# Patient Record
Sex: FEMALE | Race: WHITE | Employment: OTHER | URBAN - NONMETROPOLITAN AREA
[De-identification: names, ages, dates, MRNs, and addresses within clinical notes are randomized per-mention and may not be internally consistent; named-entity substitution may affect disease eponyms.]

---

## 2017-03-29 RX ORDER — LEVOTHYROXINE SODIUM 0.05 MG/1
50 TABLET ORAL DAILY
Qty: 90 TABLET | Refills: 1 | Status: SHIPPED | OUTPATIENT
Start: 2017-03-29 | End: 2017-10-11 | Stop reason: SDUPTHER

## 2017-04-18 ENCOUNTER — OFFICE VISIT (OUTPATIENT)
Dept: FAMILY MEDICINE CLINIC | Age: 82
End: 2017-04-18

## 2017-04-18 VITALS
DIASTOLIC BLOOD PRESSURE: 60 MMHG | HEART RATE: 55 BPM | BODY MASS INDEX: 18.67 KG/M2 | WEIGHT: 95.6 LBS | SYSTOLIC BLOOD PRESSURE: 123 MMHG

## 2017-04-18 DIAGNOSIS — E78.00 PURE HYPERCHOLESTEROLEMIA: Primary | ICD-10-CM

## 2017-04-18 DIAGNOSIS — M48.061 LUMBAR SPINAL STENOSIS: ICD-10-CM

## 2017-04-18 DIAGNOSIS — L30.9 DERMATITIS: ICD-10-CM

## 2017-04-18 PROCEDURE — 1123F ACP DISCUSS/DSCN MKR DOCD: CPT | Performed by: FAMILY MEDICINE

## 2017-04-18 PROCEDURE — 1036F TOBACCO NON-USER: CPT | Performed by: FAMILY MEDICINE

## 2017-04-18 PROCEDURE — G8427 DOCREV CUR MEDS BY ELIG CLIN: HCPCS | Performed by: FAMILY MEDICINE

## 2017-04-18 PROCEDURE — 99213 OFFICE O/P EST LOW 20 MIN: CPT | Performed by: FAMILY MEDICINE

## 2017-04-18 PROCEDURE — 1090F PRES/ABSN URINE INCON ASSESS: CPT | Performed by: FAMILY MEDICINE

## 2017-04-18 PROCEDURE — G8419 CALC BMI OUT NRM PARAM NOF/U: HCPCS | Performed by: FAMILY MEDICINE

## 2017-04-18 PROCEDURE — 4040F PNEUMOC VAC/ADMIN/RCVD: CPT | Performed by: FAMILY MEDICINE

## 2017-10-11 RX ORDER — LEVOTHYROXINE SODIUM 0.05 MG/1
50 TABLET ORAL DAILY
Qty: 30 TABLET | Refills: 0 | Status: SHIPPED | OUTPATIENT
Start: 2017-10-11 | End: 2017-10-13 | Stop reason: SDUPTHER

## 2017-10-11 NOTE — TELEPHONE ENCOUNTER
[place] [address] called requesting a refill on the following medications:  Requested Prescriptions     Pending Prescriptions Disp Refills    levothyroxine (SYNTHROID) 50 MCG tablet 90 tablet 1     Sig: Take 1 tablet by mouth daily     Pharmacy verified:  [email or web address]      Date of last visit: 4/18/17  Date of next visit (if applicable): 86/29/2208    Pt is having her son pick it up this evening if possible    Date of last fill and quantity (to be completed by clinical staff)  Pharmacy name: [email or web address]

## 2017-10-13 RX ORDER — LEVOTHYROXINE SODIUM 0.05 MG/1
50 TABLET ORAL DAILY
Qty: 30 TABLET | Refills: 0 | Status: SHIPPED | OUTPATIENT
Start: 2017-10-13 | End: 2017-11-27 | Stop reason: SDUPTHER

## 2017-11-27 RX ORDER — LEVOTHYROXINE SODIUM 0.05 MG/1
50 TABLET ORAL DAILY
Qty: 30 TABLET | Refills: 0 | Status: SHIPPED | OUTPATIENT
Start: 2017-11-27 | End: 2017-12-07 | Stop reason: SDUPTHER

## 2017-11-29 ENCOUNTER — OFFICE VISIT (OUTPATIENT)
Dept: FAMILY MEDICINE CLINIC | Age: 82
End: 2017-11-29
Payer: MEDICARE

## 2017-11-29 VITALS
TEMPERATURE: 97.6 F | HEART RATE: 82 BPM | DIASTOLIC BLOOD PRESSURE: 60 MMHG | SYSTOLIC BLOOD PRESSURE: 102 MMHG | WEIGHT: 90 LBS | HEIGHT: 60 IN | BODY MASS INDEX: 17.67 KG/M2

## 2017-11-29 DIAGNOSIS — R53.83 TIREDNESS: ICD-10-CM

## 2017-11-29 DIAGNOSIS — H61.23 BILATERAL IMPACTED CERUMEN: ICD-10-CM

## 2017-11-29 DIAGNOSIS — E03.9 ACQUIRED HYPOTHYROIDISM: ICD-10-CM

## 2017-11-29 DIAGNOSIS — J20.8 ACUTE BRONCHITIS DUE TO OTHER SPECIFIED ORGANISMS: Primary | ICD-10-CM

## 2017-11-29 PROCEDURE — G8419 CALC BMI OUT NRM PARAM NOF/U: HCPCS | Performed by: FAMILY MEDICINE

## 2017-11-29 PROCEDURE — 1123F ACP DISCUSS/DSCN MKR DOCD: CPT | Performed by: FAMILY MEDICINE

## 2017-11-29 PROCEDURE — G8427 DOCREV CUR MEDS BY ELIG CLIN: HCPCS | Performed by: FAMILY MEDICINE

## 2017-11-29 PROCEDURE — 4040F PNEUMOC VAC/ADMIN/RCVD: CPT | Performed by: FAMILY MEDICINE

## 2017-11-29 PROCEDURE — 1090F PRES/ABSN URINE INCON ASSESS: CPT | Performed by: FAMILY MEDICINE

## 2017-11-29 PROCEDURE — G8484 FLU IMMUNIZE NO ADMIN: HCPCS | Performed by: FAMILY MEDICINE

## 2017-11-29 PROCEDURE — 1036F TOBACCO NON-USER: CPT | Performed by: FAMILY MEDICINE

## 2017-11-29 PROCEDURE — 99213 OFFICE O/P EST LOW 20 MIN: CPT | Performed by: FAMILY MEDICINE

## 2017-11-29 RX ORDER — DOXYCYCLINE HYCLATE 100 MG
100 TABLET ORAL 2 TIMES DAILY
Qty: 20 TABLET | Refills: 0 | Status: SHIPPED | OUTPATIENT
Start: 2017-11-29 | End: 2018-02-13

## 2017-11-29 ASSESSMENT — ENCOUNTER SYMPTOMS
SHORTNESS OF BREATH: 0
WHEEZING: 0
VOMITING: 0
NAUSEA: 0
SORE THROAT: 0
SINUS PRESSURE: 1
COUGH: 1
RHINORRHEA: 1

## 2017-11-29 NOTE — PROGRESS NOTES
TABS Take 650 mg by mouth every 4 hours as needed for Pain (For mild pain level 1-3 or for fever > 100.5). 30 tablet 4    aspirin EC 81 MG EC tablet Take 1 tablet by mouth daily. 30 tablet 3    Multiple Vitamins-Minerals (VISION-PABLO PRESERVE PO) Take 1 tablet by mouth daily.  multivitamin (THERAGRAN) per tablet Take 1 tablet by mouth daily.  Cholecalciferol (VITAMIN D3) 3000 UNITS TABS Take  by mouth. No current facility-administered medications for this visit. Allergies   Allergen Reactions    Nsaids Other (See Comments)     Bleeding gastric ulcer    Codeine Other (See Comments)     Pass out    Dye [Iodides] Hives    Lidocaine-Epinephrine      Fast and irregular heart rate    Morphine Other (See Comments)     Makes her \"out of her head\"    Procaine Hcl      Pass out    Ultram [Tramadol] Anxiety     Health Maintenance   Topic Date Due    DTaP/Tdap/Td vaccine (1 - Tdap) 1934    Zostavax vaccine  10/10/1975    Pneumococcal low/med risk (1 of 2 - PCV13) 10/10/1980    Flu vaccine (1) 09/01/2017       Objective:     /60 (Site: Left Arm, Position: Sitting, Cuff Size: Medium Adult)   Pulse 82   Temp 97.6 °F (36.4 °C) (Oral)   Ht 5' (1.524 m)   Wt 90 lb (40.8 kg)   BMI 17.58 kg/m²   Physical Exam   Constitutional: She is oriented to person, place, and time. She appears well-developed and well-nourished. HENT:   Nose: Mucosal edema and rhinorrhea present. Mouth/Throat: Oropharynx is clear and moist.   Bilateral cerumen impaction present   Cardiovascular: Normal rate and regular rhythm. Pulmonary/Chest: Effort normal and breath sounds normal. No respiratory distress. She has no wheezes. Neurological: She is alert and oriented to person, place, and time. Psychiatric: She has a normal mood and affect. Her behavior is normal.   Vitals reviewed. Harsh cough is present    Impression/Plan:  1. Acute bronchitis due to other specified organisms  New problem.   Unlikely

## 2017-12-04 ENCOUNTER — NURSE ONLY (OUTPATIENT)
Dept: FAMILY MEDICINE CLINIC | Age: 82
End: 2017-12-04

## 2017-12-04 DIAGNOSIS — H61.23 EXCESSIVE EAR WAX, BILATERAL: Primary | ICD-10-CM

## 2017-12-04 DIAGNOSIS — E03.9 ACQUIRED HYPOTHYROIDISM: ICD-10-CM

## 2017-12-04 DIAGNOSIS — R53.83 TIREDNESS: ICD-10-CM

## 2017-12-04 LAB
ALBUMIN SERPL-MCNC: 3.6 G/DL (ref 3.5–5.1)
ALP BLD-CCNC: 73 U/L (ref 38–126)
ALT SERPL-CCNC: 8 U/L (ref 11–66)
ANION GAP SERPL CALCULATED.3IONS-SCNC: 14 MEQ/L (ref 8–16)
ANISOCYTOSIS: ABNORMAL
AST SERPL-CCNC: 19 U/L (ref 5–40)
BASOPHILS # BLD: 1.1 %
BASOPHILS ABSOLUTE: 0.1 THOU/MM3 (ref 0–0.1)
BILIRUB SERPL-MCNC: 0.4 MG/DL (ref 0.3–1.2)
BUN BLDV-MCNC: 18 MG/DL (ref 7–22)
CALCIUM SERPL-MCNC: 9.4 MG/DL (ref 8.5–10.5)
CHLORIDE BLD-SCNC: 106 MEQ/L (ref 98–111)
CO2: 24 MEQ/L (ref 23–33)
CREAT SERPL-MCNC: 1.1 MG/DL (ref 0.4–1.2)
EOSINOPHIL # BLD: 1.8 %
EOSINOPHILS ABSOLUTE: 0.1 THOU/MM3 (ref 0–0.4)
GFR SERPL CREATININE-BSD FRML MDRD: 45 ML/MIN/1.73M2
GLUCOSE BLD-MCNC: 137 MG/DL (ref 70–108)
HCT VFR BLD CALC: 36.9 % (ref 37–47)
HEMOGLOBIN: 12.1 GM/DL (ref 12–16)
LYMPHOCYTES # BLD: 32.1 %
LYMPHOCYTES ABSOLUTE: 1.7 THOU/MM3 (ref 1–4.8)
MCH RBC QN AUTO: 30.3 PG (ref 27–31)
MCHC RBC AUTO-ENTMCNC: 32.8 GM/DL (ref 33–37)
MCV RBC AUTO: 92.4 FL (ref 81–99)
MONOCYTES # BLD: 4.3 %
MONOCYTES ABSOLUTE: 0.2 THOU/MM3 (ref 0.4–1.3)
NUCLEATED RED BLOOD CELLS: 0 /100 WBC
PDW BLD-RTO: 15.2 % (ref 11.5–14.5)
PLATELET # BLD: 321 THOU/MM3 (ref 130–400)
PMV BLD AUTO: 8.8 MCM (ref 7.4–10.4)
POTASSIUM SERPL-SCNC: 4.3 MEQ/L (ref 3.5–5.2)
RBC # BLD: 3.99 MILL/MM3 (ref 4.2–5.4)
SEG NEUTROPHILS: 60.7 %
SEGMENTED NEUTROPHILS ABSOLUTE COUNT: 3.2 THOU/MM3 (ref 1.8–7.7)
SODIUM BLD-SCNC: 144 MEQ/L (ref 135–145)
TOTAL PROTEIN: 6.5 G/DL (ref 6.1–8)
TSH SERPL DL<=0.05 MIU/L-ACNC: 4.05 UIU/ML (ref 0.4–4.2)
WBC # BLD: 5.3 THOU/MM3 (ref 4.8–10.8)

## 2017-12-04 NOTE — PROGRESS NOTES
Bilateral ears were lavaged and a moderate amount of was was removed from the right ear. Only a small amount was removed from the left ear. These were checked by Dr Trevor Blanchard. The [facility] staff is to use the Debrox ear drops for 4 more days in the left ear and she will return on Friday for further flushing. Her son was with her and states he understands the plan.

## 2017-12-07 ENCOUNTER — OFFICE VISIT (OUTPATIENT)
Dept: FAMILY MEDICINE CLINIC | Age: 82
End: 2017-12-07
Payer: MEDICARE

## 2017-12-07 VITALS
HEART RATE: 66 BPM | WEIGHT: 90 LBS | BODY MASS INDEX: 17.67 KG/M2 | DIASTOLIC BLOOD PRESSURE: 80 MMHG | HEIGHT: 60 IN | SYSTOLIC BLOOD PRESSURE: 122 MMHG

## 2017-12-07 DIAGNOSIS — I87.2 VENOUS INSUFFICIENCY OF BOTH LOWER EXTREMITIES: Chronic | ICD-10-CM

## 2017-12-07 DIAGNOSIS — E03.9 ACQUIRED HYPOTHYROIDISM: Primary | ICD-10-CM

## 2017-12-07 DIAGNOSIS — N18.30 CKD (CHRONIC KIDNEY DISEASE), STAGE III (HCC): ICD-10-CM

## 2017-12-07 DIAGNOSIS — R73.01 ELEVATED FASTING GLUCOSE: ICD-10-CM

## 2017-12-07 PROCEDURE — 1090F PRES/ABSN URINE INCON ASSESS: CPT | Performed by: FAMILY MEDICINE

## 2017-12-07 PROCEDURE — 1036F TOBACCO NON-USER: CPT | Performed by: FAMILY MEDICINE

## 2017-12-07 PROCEDURE — 4040F PNEUMOC VAC/ADMIN/RCVD: CPT | Performed by: FAMILY MEDICINE

## 2017-12-07 PROCEDURE — G8484 FLU IMMUNIZE NO ADMIN: HCPCS | Performed by: FAMILY MEDICINE

## 2017-12-07 PROCEDURE — G8427 DOCREV CUR MEDS BY ELIG CLIN: HCPCS | Performed by: FAMILY MEDICINE

## 2017-12-07 PROCEDURE — 99213 OFFICE O/P EST LOW 20 MIN: CPT | Performed by: FAMILY MEDICINE

## 2017-12-07 PROCEDURE — 1123F ACP DISCUSS/DSCN MKR DOCD: CPT | Performed by: FAMILY MEDICINE

## 2017-12-07 PROCEDURE — G8419 CALC BMI OUT NRM PARAM NOF/U: HCPCS | Performed by: FAMILY MEDICINE

## 2017-12-07 RX ORDER — LEVOTHYROXINE SODIUM 0.05 MG/1
50 TABLET ORAL DAILY
Qty: 90 TABLET | Refills: 3 | Status: SHIPPED | OUTPATIENT
Start: 2017-12-07 | End: 2018-06-12 | Stop reason: SDUPTHER

## 2017-12-07 NOTE — PROGRESS NOTES
[facility] PROFESSIONAL SERVS  [facility]  [address] [address]  Dept: [phone]  Dept Fax: [phone]  Loc: [phone]  PROGRESS NOTE      Visit Date: 12/7/2017    Kalpesh Robert is a 80 y.o. female who presents today for:  Chief Complaint   Patient presents with    Follow-up     bronchitis- feeling better, decreased coughing,  edema in bilateral feet        Subjective:  HPI    F/u bronchitis. Doing better. Decreased coughing. Took some of her antibiotic.      6 month f/u hypothyroidism. On synthroid 50 mcg. No symptoms. Needs ears cleaned tomorrow. Edema in feet. She wears compressive stockings. Son is present    Review of Systems     Past Medical History:   Diagnosis Date    Osteoarthritis     Stenosis of cervical spine     Thyroid disease     Ulcer ([address]       Past Surgical History:   Procedure Laterality Date    BACK SURGERY      COLONOSCOPY      HYSTERECTOMY      SHOULDER SURGERY      UPPER GASTROINTESTINAL ENDOSCOPY       Family History   Problem Relation Age of Onset    Heart Disease Father     Early Death Father     Diabetes Maternal Aunt     Heart Disease Maternal Aunt     Heart Disease Maternal Uncle     Diabetes Paternal Aunt     Diabetes Paternal Uncle     Stroke Maternal Grandmother     Dementia Maternal Grandfather     Diabetes Paternal Grandmother     Diabetes Paternal Grandfather      Social History   Substance Use Topics    Smoking status: Former Smoker     Quit date: 1/1/1976    Smokeless tobacco: Never Used    Alcohol use No      Current Outpatient Prescriptions   Medication Sig Dispense Refill    doxycycline hyclate (VIBRA-TABS) 100 MG tablet Take 1 tablet by mouth 2 times daily 20 tablet 0    levothyroxine (SYNTHROID) 50 MCG tablet Take 1 tablet by mouth daily 30 tablet 0    Cyanocobalamin (B-12) 3000 MCG CAPS Take  by mouth daily.       acetaminophen 650 MG TABS Take 650 mg by mouth every 12/04/2017    BUN 18 12/04/2017    CO2 24 12/04/2017    TSH 4.050 12/04/2017       Impression/Plan:  1. Acquired hypothyroidism  controlled  -refill levothyroxine (SYNTHROID) 50 MCG tablet; Take 1 tablet by mouth daily  Dispense: 90 tablet; Refill: 3    2. CKD (chronic kidney disease), stage III  Stable. 3. Venous insufficiency of both lower extremities  Stable. Elevate legs. No evidence of DVT. 4. Elevated fasting glucose  Glucose 137. We decide to not pursue testing for DM      Had flu vaccine  Declines pneumo vaccine. Had 1 about 20 years ago. They voiced understanding. All questions answered. They agreed with treatment plan. Discussed use, benefit, and side effects of prescribed medications. Reviewed health maintenance. Return in about 6 months (around 6/7/2018) for thyroid.        Electronically signed by Jolly Sandoval MD on 12/7/2017 at 3:44 PM

## 2017-12-08 ENCOUNTER — TELEPHONE (OUTPATIENT)
Dept: FAMILY MEDICINE CLINIC | Age: 82
End: 2017-12-08

## 2017-12-08 NOTE — TELEPHONE ENCOUNTER
[employer] came in for a repeat lavage of her left ear. I was unable to remove the dried skin and wax mass that was in her ear canal, but it was becoming tender with lavaging. Dr. Keenan Nails check this ear canal and removed a small amount of the skin. ATB drops were instilled x 1 dose.

## 2018-02-13 ENCOUNTER — HOSPITAL ENCOUNTER (EMERGENCY)
Age: 83
Discharge: HOME | End: 2018-02-13
Attending: FAMILY MEDICINE
Payer: MEDICARE

## 2018-02-13 VITALS
OXYGEN SATURATION: 98 % | HEART RATE: 89 BPM | BODY MASS INDEX: 17.58 KG/M2 | RESPIRATION RATE: 16 BRPM | DIASTOLIC BLOOD PRESSURE: 72 MMHG | SYSTOLIC BLOOD PRESSURE: 155 MMHG | WEIGHT: 90 LBS | TEMPERATURE: 97.7 F

## 2018-02-13 DIAGNOSIS — R42 LIGHTHEADEDNESS: ICD-10-CM

## 2018-02-13 DIAGNOSIS — R19.7 DIARRHEA, UNSPECIFIED TYPE: Primary | ICD-10-CM

## 2018-02-13 LAB
ANION GAP SERPL CALCULATED.3IONS-SCNC: 10 MEQ/L (ref 8–16)
ANISOCYTOSIS: ABNORMAL
BASOPHILS # BLD: 1.1 %
BASOPHILS ABSOLUTE: 0.1 THOU/MM3 (ref 0–0.1)
BUN BLDV-MCNC: 18 MG/DL (ref 7–22)
CALCIUM SERPL-MCNC: 10.2 MG/DL (ref 8.5–10.5)
CHLORIDE BLD-SCNC: 103 MEQ/L (ref 98–111)
CO2: 28 MEQ/L (ref 23–33)
CREAT SERPL-MCNC: 0.9 MG/DL (ref 0.4–1.2)
EKG ATRIAL RATE: 84 BPM
EKG P AXIS: 76 DEGREES
EKG P-R INTERVAL: 184 MS
EKG Q-T INTERVAL: 384 MS
EKG QRS DURATION: 112 MS
EKG QTC CALCULATION (BAZETT): 453 MS
EKG R AXIS: -48 DEGREES
EKG T AXIS: 102 DEGREES
EKG VENTRICULAR RATE: 84 BPM
EOSINOPHIL # BLD: 1.4 %
EOSINOPHILS ABSOLUTE: 0.1 THOU/MM3 (ref 0–0.4)
GFR SERPL CREATININE-BSD FRML MDRD: 57 ML/MIN/1.73M2
GLUCOSE BLD-MCNC: 128 MG/DL (ref 70–108)
HCT VFR BLD CALC: 36.5 % (ref 37–47)
HEMOGLOBIN: 11.8 GM/DL (ref 12–16)
LYMPHOCYTES # BLD: 27 %
LYMPHOCYTES ABSOLUTE: 1.3 THOU/MM3 (ref 1–4.8)
MCH RBC QN AUTO: 29.8 PG (ref 27–31)
MCHC RBC AUTO-ENTMCNC: 32.4 GM/DL (ref 33–37)
MCV RBC AUTO: 92 FL (ref 81–99)
MONOCYTES # BLD: 6.3 %
MONOCYTES ABSOLUTE: 0.3 THOU/MM3 (ref 0.4–1.3)
NUCLEATED RED BLOOD CELLS: 0 /100 WBC
OSMOLALITY CALCULATION: 284.8 MOSMOL/KG (ref 275–300)
PDW BLD-RTO: 16.7 % (ref 11.5–14.5)
PLATELET # BLD: 238 THOU/MM3 (ref 130–400)
PMV BLD AUTO: 8.3 FL (ref 7.4–10.4)
POTASSIUM SERPL-SCNC: 5 MEQ/L (ref 3.5–5.2)
RBC # BLD: 3.96 MILL/MM3 (ref 4.2–5.4)
SEG NEUTROPHILS: 64.2 %
SEGMENTED NEUTROPHILS ABSOLUTE COUNT: 3.1 THOU/MM3 (ref 1.8–7.7)
SODIUM BLD-SCNC: 141 MEQ/L (ref 135–145)
WBC # BLD: 4.8 THOU/MM3 (ref 4.8–10.8)

## 2018-02-13 PROCEDURE — 96360 HYDRATION IV INFUSION INIT: CPT

## 2018-02-13 PROCEDURE — 96361 HYDRATE IV INFUSION ADD-ON: CPT

## 2018-02-13 PROCEDURE — 80048 BASIC METABOLIC PNL TOTAL CA: CPT

## 2018-02-13 PROCEDURE — 85025 COMPLETE CBC W/AUTO DIFF WBC: CPT

## 2018-02-13 PROCEDURE — 36415 COLL VENOUS BLD VENIPUNCTURE: CPT

## 2018-02-13 PROCEDURE — 93005 ELECTROCARDIOGRAM TRACING: CPT | Performed by: FAMILY MEDICINE

## 2018-02-13 PROCEDURE — 2580000003 HC RX 258: Performed by: FAMILY MEDICINE

## 2018-02-13 PROCEDURE — 99284 EMERGENCY DEPT VISIT MOD MDM: CPT

## 2018-02-13 RX ORDER — 0.9 % SODIUM CHLORIDE 0.9 %
500 INTRAVENOUS SOLUTION INTRAVENOUS ONCE
Status: COMPLETED | OUTPATIENT
Start: 2018-02-13 | End: 2018-02-13

## 2018-02-13 RX ORDER — SODIUM CHLORIDE 9 MG/ML
INJECTION, SOLUTION INTRAVENOUS CONTINUOUS
Status: DISCONTINUED | OUTPATIENT
Start: 2018-02-13 | End: 2018-02-14 | Stop reason: HOSPADM

## 2018-02-13 RX ADMIN — SODIUM CHLORIDE 500 ML: 9 INJECTION, SOLUTION INTRAVENOUS at 20:00

## 2018-02-13 RX ADMIN — SODIUM CHLORIDE: 9 INJECTION, SOLUTION INTRAVENOUS at 20:15

## 2018-02-13 ASSESSMENT — ENCOUNTER SYMPTOMS
NAUSEA: 0
DIARRHEA: 1
TROUBLE SWALLOWING: 0
VOMITING: 0
ABDOMINAL PAIN: 0
SHORTNESS OF BREATH: 0

## 2018-02-13 ASSESSMENT — PAIN DESCRIPTION - ORIENTATION: ORIENTATION: LOWER

## 2018-02-13 ASSESSMENT — PAIN DESCRIPTION - LOCATION: LOCATION: ABDOMEN

## 2018-02-13 ASSESSMENT — PAIN SCALES - GENERAL: PAINLEVEL_OUTOF10: 4

## 2018-02-13 ASSESSMENT — PAIN DESCRIPTION - ONSET: ONSET: GRADUAL

## 2018-02-13 ASSESSMENT — PAIN DESCRIPTION - PAIN TYPE: TYPE: ACUTE PAIN

## 2018-02-13 ASSESSMENT — PAIN DESCRIPTION - FREQUENCY: FREQUENCY: CONTINUOUS

## 2018-02-14 PROCEDURE — 93010 ELECTROCARDIOGRAM REPORT: CPT | Performed by: INTERNAL MEDICINE

## 2018-02-14 NOTE — ED PROVIDER NOTES
Negative for rash. Neurological: Positive for light-headedness. Negative for weakness and numbness. Hematological: Does not bruise/bleed easily. Psychiatric/Behavioral: Negative for confusion. PAST MEDICAL HISTORY    has a past medical history of Osteoarthritis; Stenosis of cervical spine; Thyroid disease; and Ulcer ([address] SURGICAL HISTORY      has a past surgical history that includes Hysterectomy; back surgery; shoulder surgery; Colonoscopy; and Upper gastrointestinal endoscopy. CURRENT MEDICATIONS       Previous Medications    ACETAMINOPHEN 650 MG TABS    Take 650 mg by mouth every 4 hours as needed for Pain (For mild pain level 1-3 or for fever > 100.5). CHOLECALCIFEROL (VITAMIN D3) 3000 UNITS TABS    Take  by mouth. LEVOTHYROXINE (SYNTHROID) 50 MCG TABLET    Take 1 tablet by mouth daily    MULTIPLE VITAMINS-MINERALS (VISION-PABLO PRESERVE PO)    Take 1 tablet by mouth daily. MULTIVITAMIN (THERAGRAN) PER TABLET    Take 1 tablet by mouth daily. ALLERGIES     is allergic to nsaids; codeine; dye [iodides]; lidocaine-epinephrine; morphine; procaine hcl; and ultram [tramadol]. FAMILY HISTORY     indicated that her mother is . She indicated that her father is . She indicated that her maternal grandmother is . She indicated that her maternal grandfather is . She indicated that her paternal grandmother is . She indicated that her paternal grandfather is . She indicated that the status of her maternal aunt is unknown. She indicated that the status of her maternal uncle is unknown. She indicated that the status of her paternal aunt is unknown.  She indicated that the status of her paternal uncle is unknown.    family history includes Dementia in her maternal grandfather; Diabetes in her maternal aunt, paternal aunt, paternal grandfather, paternal grandmother, and paternal uncle; Early Death in her father; Heart Disease in her father, maternal aunt, and maternal uncle; Stroke in her maternal grandmother. SOCIAL HISTORY      reports that she quit smoking about 42 years ago. She has never used smokeless tobacco. She reports that she does not drink alcohol or use drugs. PHYSICAL EXAM     INITIAL VITALS:  weight is 90 lb (40.8 kg). Her oral temperature is 97.7 °F (36.5 °C). Her blood pressure is 155/72 (abnormal) and her pulse is 89. Her respiration is 16 and oxygen saturation is 98%. Physical Exam   Constitutional: She is oriented to person, place, and time. GCS 15    Alert, very active and sharp for 8 years old   HENT:   Head: Normocephalic and atraumatic. Eyes: Conjunctivae are normal. Pupils are equal, round, and reactive to light. No scleral icterus. Neck: Normal range of motion. Neck supple. No JVD present. Cardiovascular: Normal rate and regular rhythm. Pulmonary/Chest: Effort normal and breath sounds normal.   Abdominal: Soft. Bowel sounds are normal. There is no tenderness. There is no rebound and no guarding. Musculoskeletal: Normal range of motion. She exhibits no edema. Neurological: She is alert and oriented to person, place, and time. She exhibits normal muscle tone. Skin: Skin is warm and dry. Psychiatric: She has a normal mood and affect. Her behavior is normal.   Nursing note and vitals reviewed. DIFFERENTIAL DIAGNOSIS:     1 episode of diarrhea with lightheadedness which is better    Give IV fluids, check blood work    DIAGNOSTIC RESULTS     EKG: All EKG's are interpreted by the Emergency Department Physician who either signs or Co-signs this chart in the absence of a cardiologist.  EKG interpreted by Yasmine Reyes MD:    EKG showed sinus rhythm with rate 84. QRS complexes show left axis, 112 ms conduction.   Slow R wave progress across precordium possible old anterolateral MI  ST-T waves show ST sagging 1 aVL and V6  No prior EKG available for comparison      RADIOLOGY: non-plain film

## 2018-02-15 ENCOUNTER — OFFICE VISIT (OUTPATIENT)
Dept: FAMILY MEDICINE CLINIC | Age: 83
End: 2018-02-15
Payer: MEDICARE

## 2018-02-15 VITALS
HEART RATE: 76 BPM | DIASTOLIC BLOOD PRESSURE: 62 MMHG | BODY MASS INDEX: 18.49 KG/M2 | TEMPERATURE: 97.8 F | HEIGHT: 60 IN | WEIGHT: 94.2 LBS | SYSTOLIC BLOOD PRESSURE: 132 MMHG

## 2018-02-15 DIAGNOSIS — B34.9 VIRAL ILLNESS: ICD-10-CM

## 2018-02-15 DIAGNOSIS — R53.1 WEAKNESS: ICD-10-CM

## 2018-02-15 DIAGNOSIS — E46 MALNUTRITION, UNSPECIFIED TYPE (HCC): Primary | ICD-10-CM

## 2018-02-15 PROCEDURE — G8484 FLU IMMUNIZE NO ADMIN: HCPCS | Performed by: FAMILY MEDICINE

## 2018-02-15 PROCEDURE — 1123F ACP DISCUSS/DSCN MKR DOCD: CPT | Performed by: FAMILY MEDICINE

## 2018-02-15 PROCEDURE — 4040F PNEUMOC VAC/ADMIN/RCVD: CPT | Performed by: FAMILY MEDICINE

## 2018-02-15 PROCEDURE — G8419 CALC BMI OUT NRM PARAM NOF/U: HCPCS | Performed by: FAMILY MEDICINE

## 2018-02-15 PROCEDURE — 1090F PRES/ABSN URINE INCON ASSESS: CPT | Performed by: FAMILY MEDICINE

## 2018-02-15 PROCEDURE — G8427 DOCREV CUR MEDS BY ELIG CLIN: HCPCS | Performed by: FAMILY MEDICINE

## 2018-02-15 PROCEDURE — 99213 OFFICE O/P EST LOW 20 MIN: CPT | Performed by: FAMILY MEDICINE

## 2018-02-15 PROCEDURE — 1036F TOBACCO NON-USER: CPT | Performed by: FAMILY MEDICINE

## 2018-02-15 ASSESSMENT — ENCOUNTER SYMPTOMS
RHINORRHEA: 0
SHORTNESS OF BREATH: 0
ABDOMINAL PAIN: 0
SORE THROAT: 0
DIARRHEA: 0
WHEEZING: 0

## 2018-02-15 ASSESSMENT — PATIENT HEALTH QUESTIONNAIRE - PHQ9
1. LITTLE INTEREST OR PLEASURE IN DOING THINGS: 0
SUM OF ALL RESPONSES TO PHQ QUESTIONS 1-9: 0
SUM OF ALL RESPONSES TO PHQ9 QUESTIONS 1 & 2: 0
2. FEELING DOWN, DEPRESSED OR HOPELESS: 0

## 2018-02-15 NOTE — PROGRESS NOTES
Prescriptions   Medication Sig Dispense Refill    levothyroxine (SYNTHROID) 50 MCG tablet Take 1 tablet by mouth daily 90 tablet 3    acetaminophen 650 MG TABS Take 650 mg by mouth every 4 hours as needed for Pain (For mild pain level 1-3 or for fever > 100.5). 30 tablet 4    Multiple Vitamins-Minerals (VISION-PABLO PRESERVE PO) Take 1 tablet by mouth daily.  multivitamin (THERAGRAN) per tablet Take 1 tablet by mouth daily.  Cholecalciferol (VITAMIN D3) 3000 UNITS TABS Take  by mouth. No current facility-administered medications for this visit. Allergies   Allergen Reactions    Nsaids Other (See Comments)     Bleeding gastric ulcer    Codeine Other (See Comments)     Pass out    Dye [Iodides] Hives    Lidocaine-Epinephrine      Fast and irregular heart rate    Morphine Other (See Comments)     Makes her \"out of her head\"    Procaine Hcl      Pass out    Ultram [Tramadol] Anxiety     Health Maintenance   Topic Date Due    DTaP/Tdap/Td vaccine (1 - Tdap) 1934    Zostavax vaccine  10/10/1975    Pneumococcal low/med risk (1 of 2 - PCV13) 10/10/1980    Flu vaccine (1) 09/01/2017    TSH testing  12/04/2018    Potassium monitoring  02/13/2019    Creatinine monitoring  02/13/2019       Objective:  /62 (Site: Left Arm, Position: Sitting, Cuff Size: Medium Adult)   Pulse 76   Temp 97.8 °F (36.6 °C) (Oral)   Ht 5' (1.524 m)   Wt 94 lb 3.2 oz (42.7 kg)   BMI 18.40 kg/m²   Physical Exam   Constitutional: She is oriented to person, place, and time. She appears cachectic. HENT:   Right Ear: Tympanic membrane and external ear normal.   Left Ear: Tympanic membrane and external ear normal.   Mouth/Throat: Oropharynx is clear and moist. No oropharyngeal exudate. Cardiovascular: Normal rate and regular rhythm. Pulmonary/Chest: Effort normal and breath sounds normal. No respiratory distress. She has no wheezes. Abdominal: Soft. There is no tenderness.    Neurological: She is

## 2018-06-12 DIAGNOSIS — E03.9 ACQUIRED HYPOTHYROIDISM: ICD-10-CM

## 2018-06-12 RX ORDER — LEVOTHYROXINE SODIUM 0.05 MG/1
50 TABLET ORAL DAILY
Qty: 30 TABLET | Refills: 0 | Status: SHIPPED | OUTPATIENT
Start: 2018-06-12 | End: 2018-11-29 | Stop reason: SDUPTHER

## 2018-06-14 ENCOUNTER — OFFICE VISIT (OUTPATIENT)
Dept: FAMILY MEDICINE CLINIC | Age: 83
End: 2018-06-14
Payer: MEDICARE

## 2018-06-14 ENCOUNTER — HOSPITAL ENCOUNTER (OUTPATIENT)
Age: 83
Discharge: HOME | End: 2018-06-14
Payer: MEDICARE

## 2018-06-14 VITALS
HEART RATE: 72 BPM | WEIGHT: 93.8 LBS | HEIGHT: 60 IN | SYSTOLIC BLOOD PRESSURE: 120 MMHG | DIASTOLIC BLOOD PRESSURE: 68 MMHG | BODY MASS INDEX: 18.42 KG/M2

## 2018-06-14 DIAGNOSIS — E03.9 ACQUIRED HYPOTHYROIDISM: Primary | ICD-10-CM

## 2018-06-14 DIAGNOSIS — N18.30 CKD (CHRONIC KIDNEY DISEASE), STAGE III (HCC): ICD-10-CM

## 2018-06-14 DIAGNOSIS — E03.9 ACQUIRED HYPOTHYROIDISM: ICD-10-CM

## 2018-06-14 LAB
ANION GAP SERPL CALCULATED.3IONS-SCNC: 13 MEQ/L (ref 8–16)
BUN BLDV-MCNC: 23 MG/DL (ref 7–22)
CALCIUM SERPL-MCNC: 9.2 MG/DL (ref 8.5–10.5)
CHLORIDE BLD-SCNC: 107 MEQ/L (ref 98–111)
CO2: 25 MEQ/L (ref 23–33)
CREAT SERPL-MCNC: 1.2 MG/DL (ref 0.4–1.2)
GFR SERPL CREATININE-BSD FRML MDRD: 41 ML/MIN/1.73M2
GLUCOSE BLD-MCNC: 147 MG/DL (ref 70–108)
HCT VFR BLD CALC: 34.7 % (ref 37–47)
HEMOGLOBIN: 11.4 GM/DL (ref 12–16)
MCH RBC QN AUTO: 30.5 PG (ref 27–31)
MCHC RBC AUTO-ENTMCNC: 32.8 GM/DL (ref 33–37)
MCV RBC AUTO: 92.8 FL (ref 81–99)
PDW BLD-RTO: 15.2 % (ref 11.5–14.5)
PLATELET # BLD: 239 THOU/MM3 (ref 130–400)
PMV BLD AUTO: 8.8 FL (ref 7.4–10.4)
POTASSIUM SERPL-SCNC: 4.4 MEQ/L (ref 3.5–5.2)
RBC # BLD: 3.74 MILL/MM3 (ref 4.2–5.4)
SODIUM BLD-SCNC: 145 MEQ/L (ref 135–145)
TSH SERPL DL<=0.05 MIU/L-ACNC: 0.81 UIU/ML (ref 0.4–4.2)
WBC # BLD: 3.9 THOU/MM3 (ref 4.8–10.8)

## 2018-06-14 PROCEDURE — 85027 COMPLETE CBC AUTOMATED: CPT

## 2018-06-14 PROCEDURE — G8419 CALC BMI OUT NRM PARAM NOF/U: HCPCS | Performed by: FAMILY MEDICINE

## 2018-06-14 PROCEDURE — 4040F PNEUMOC VAC/ADMIN/RCVD: CPT | Performed by: FAMILY MEDICINE

## 2018-06-14 PROCEDURE — 1090F PRES/ABSN URINE INCON ASSESS: CPT | Performed by: FAMILY MEDICINE

## 2018-06-14 PROCEDURE — 1123F ACP DISCUSS/DSCN MKR DOCD: CPT | Performed by: FAMILY MEDICINE

## 2018-06-14 PROCEDURE — 84443 ASSAY THYROID STIM HORMONE: CPT

## 2018-06-14 PROCEDURE — 1036F TOBACCO NON-USER: CPT | Performed by: FAMILY MEDICINE

## 2018-06-14 PROCEDURE — G8427 DOCREV CUR MEDS BY ELIG CLIN: HCPCS | Performed by: FAMILY MEDICINE

## 2018-06-14 PROCEDURE — 99212 OFFICE O/P EST SF 10 MIN: CPT | Performed by: FAMILY MEDICINE

## 2018-06-14 PROCEDURE — 80048 BASIC METABOLIC PNL TOTAL CA: CPT

## 2018-06-14 PROCEDURE — 36415 COLL VENOUS BLD VENIPUNCTURE: CPT

## 2018-06-14 ASSESSMENT — ENCOUNTER SYMPTOMS
SHORTNESS OF BREATH: 0
WHEEZING: 0

## 2018-06-15 ENCOUNTER — TELEPHONE (OUTPATIENT)
Dept: FAMILY MEDICINE CLINIC | Age: 83
End: 2018-06-15

## 2018-11-09 ENCOUNTER — APPOINTMENT (OUTPATIENT)
Dept: MRI IMAGING | Age: 83
DRG: 551 | End: 2018-11-09
Payer: MEDICARE

## 2018-11-09 ENCOUNTER — APPOINTMENT (OUTPATIENT)
Dept: CT IMAGING | Age: 83
DRG: 551 | End: 2018-11-09
Payer: MEDICARE

## 2018-11-09 ENCOUNTER — HOSPITAL ENCOUNTER (INPATIENT)
Age: 83
LOS: 3 days | Discharge: HOME HEALTH | DRG: 551 | End: 2018-11-12
Attending: INTERNAL MEDICINE | Admitting: INTERNAL MEDICINE
Payer: MEDICARE

## 2018-11-09 DIAGNOSIS — S39.012A STRAIN OF LUMBAR REGION, INITIAL ENCOUNTER: Primary | ICD-10-CM

## 2018-11-09 DIAGNOSIS — R52 UNCONTROLLED PAIN: ICD-10-CM

## 2018-11-09 PROBLEM — M54.9 INTRACTABLE BACK PAIN: Status: ACTIVE | Noted: 2018-11-09

## 2018-11-09 LAB
ALBUMIN SERPL-MCNC: 3.6 G/DL (ref 3.5–5.1)
ALP BLD-CCNC: 74 U/L (ref 38–126)
ALT SERPL-CCNC: 10 U/L (ref 11–66)
ANION GAP SERPL CALCULATED.3IONS-SCNC: 13 MEQ/L (ref 8–16)
AST SERPL-CCNC: 25 U/L (ref 5–40)
BACTERIA: ABNORMAL /HPF
BASOPHILS # BLD: 0.9 %
BASOPHILS ABSOLUTE: 0 THOU/MM3 (ref 0–0.1)
BILIRUB SERPL-MCNC: 0.3 MG/DL (ref 0.3–1.2)
BILIRUBIN URINE: NEGATIVE
BLOOD, URINE: ABNORMAL
BUN BLDV-MCNC: 13 MG/DL (ref 7–22)
CALCIUM SERPL-MCNC: 9.9 MG/DL (ref 8.5–10.5)
CASTS 2: ABNORMAL /LPF
CASTS UA: ABNORMAL /LPF
CHARACTER, URINE: CLEAR
CHLORIDE BLD-SCNC: 100 MEQ/L (ref 98–111)
CO2: 25 MEQ/L (ref 23–33)
COLOR: YELLOW
CREAT SERPL-MCNC: 1 MG/DL (ref 0.4–1.2)
CRYSTALS, UA: ABNORMAL
EOSINOPHIL # BLD: 4 %
EOSINOPHILS ABSOLUTE: 0.2 THOU/MM3 (ref 0–0.4)
EPITHELIAL CELLS, UA: ABNORMAL /HPF
ERYTHROCYTE [DISTWIDTH] IN BLOOD BY AUTOMATED COUNT: 13.7 % (ref 11.5–14.5)
ERYTHROCYTE [DISTWIDTH] IN BLOOD BY AUTOMATED COUNT: 47.1 FL (ref 35–45)
GFR SERPL CREATININE-BSD FRML MDRD: 51 ML/MIN/1.73M2
GLUCOSE BLD-MCNC: 95 MG/DL (ref 70–108)
GLUCOSE URINE: NEGATIVE MG/DL
HCT VFR BLD CALC: 40.9 % (ref 37–47)
HEMOGLOBIN: 13.5 GM/DL (ref 12–16)
IMMATURE GRANS (ABS): 0.02 THOU/MM3 (ref 0–0.07)
IMMATURE GRANULOCYTES: 0.4 %
KETONES, URINE: NEGATIVE
LEUKOCYTE ESTERASE, URINE: NEGATIVE
LYMPHOCYTES # BLD: 30.2 %
LYMPHOCYTES ABSOLUTE: 1.6 THOU/MM3 (ref 1–4.8)
MCH RBC QN AUTO: 30.7 PG (ref 26–33)
MCHC RBC AUTO-ENTMCNC: 33 GM/DL (ref 32.2–35.5)
MCV RBC AUTO: 93 FL (ref 81–99)
MISCELLANEOUS 2: ABNORMAL
MONOCYTES # BLD: 6.5 %
MONOCYTES ABSOLUTE: 0.3 THOU/MM3 (ref 0.4–1.3)
NITRITE, URINE: NEGATIVE
NUCLEATED RED BLOOD CELLS: 0 /100 WBC
OSMOLALITY CALCULATION: 275.6 MOSMOL/KG (ref 275–300)
PH UA: 7
PLATELET # BLD: 254 THOU/MM3 (ref 130–400)
PMV BLD AUTO: 9.8 FL (ref 9.4–12.4)
POTASSIUM SERPL-SCNC: 4.3 MEQ/L (ref 3.5–5.2)
PROTEIN UA: NEGATIVE
RBC # BLD: 4.4 MILL/MM3 (ref 4.2–5.4)
RBC URINE: ABNORMAL /HPF
RENAL EPITHELIAL, UA: ABNORMAL
SEDIMENTATION RATE, ERYTHROCYTE: 19 MM/HR (ref 0–20)
SEG NEUTROPHILS: 58 %
SEGMENTED NEUTROPHILS ABSOLUTE COUNT: 3.1 THOU/MM3 (ref 1.8–7.7)
SODIUM BLD-SCNC: 138 MEQ/L (ref 135–145)
SPECIFIC GRAVITY, URINE: 1.01 (ref 1–1.03)
T4 FREE: 1.59 NG/DL (ref 0.93–1.76)
TOTAL PROTEIN: 6.9 G/DL (ref 6.1–8)
TSH SERPL DL<=0.05 MIU/L-ACNC: 1.75 UIU/ML (ref 0.4–4.2)
UROBILINOGEN, URINE: 0.2 EU/DL
WBC # BLD: 5.3 THOU/MM3 (ref 4.8–10.8)
WBC UA: ABNORMAL /HPF
YEAST: ABNORMAL

## 2018-11-09 PROCEDURE — 3E0U33Z INTRODUCTION OF ANTI-INFLAMMATORY INTO JOINTS, PERCUTANEOUS APPROACH: ICD-10-PCS | Performed by: RADIOLOGY

## 2018-11-09 PROCEDURE — 6360000002 HC RX W HCPCS: Performed by: INTERNAL MEDICINE

## 2018-11-09 PROCEDURE — 80053 COMPREHEN METABOLIC PANEL: CPT

## 2018-11-09 PROCEDURE — 85025 COMPLETE CBC W/AUTO DIFF WBC: CPT

## 2018-11-09 PROCEDURE — 2580000003 HC RX 258: Performed by: INTERNAL MEDICINE

## 2018-11-09 PROCEDURE — 6370000000 HC RX 637 (ALT 250 FOR IP): Performed by: INTERNAL MEDICINE

## 2018-11-09 PROCEDURE — 84443 ASSAY THYROID STIM HORMONE: CPT

## 2018-11-09 PROCEDURE — 1200000000 HC SEMI PRIVATE

## 2018-11-09 PROCEDURE — 36415 COLL VENOUS BLD VENIPUNCTURE: CPT

## 2018-11-09 PROCEDURE — 72131 CT LUMBAR SPINE W/O DYE: CPT

## 2018-11-09 PROCEDURE — 72148 MRI LUMBAR SPINE W/O DYE: CPT

## 2018-11-09 PROCEDURE — 72192 CT PELVIS W/O DYE: CPT

## 2018-11-09 PROCEDURE — 99285 EMERGENCY DEPT VISIT HI MDM: CPT

## 2018-11-09 PROCEDURE — 84439 ASSAY OF FREE THYROXINE: CPT

## 2018-11-09 PROCEDURE — 81001 URINALYSIS AUTO W/SCOPE: CPT

## 2018-11-09 PROCEDURE — 85651 RBC SED RATE NONAUTOMATED: CPT

## 2018-11-09 RX ORDER — SODIUM CHLORIDE 0.9 % (FLUSH) 0.9 %
10 SYRINGE (ML) INJECTION EVERY 12 HOURS SCHEDULED
Status: DISCONTINUED | OUTPATIENT
Start: 2018-11-09 | End: 2018-11-12 | Stop reason: HOSPADM

## 2018-11-09 RX ORDER — MULTIVITAMIN WITH FOLIC ACID 400 MCG
1 TABLET ORAL DAILY
Status: DISCONTINUED | OUTPATIENT
Start: 2018-11-09 | End: 2018-11-12 | Stop reason: HOSPADM

## 2018-11-09 RX ORDER — GABAPENTIN 100 MG/1
100 CAPSULE ORAL EVERY 8 HOURS SCHEDULED
Status: DISCONTINUED | OUTPATIENT
Start: 2018-11-09 | End: 2018-11-12 | Stop reason: HOSPADM

## 2018-11-09 RX ORDER — ONDANSETRON 2 MG/ML
4 INJECTION INTRAMUSCULAR; INTRAVENOUS EVERY 6 HOURS PRN
Status: DISCONTINUED | OUTPATIENT
Start: 2018-11-09 | End: 2018-11-12 | Stop reason: HOSPADM

## 2018-11-09 RX ORDER — LEVOTHYROXINE SODIUM 0.05 MG/1
50 TABLET ORAL DAILY
Status: DISCONTINUED | OUTPATIENT
Start: 2018-11-10 | End: 2018-11-12 | Stop reason: HOSPADM

## 2018-11-09 RX ORDER — SODIUM CHLORIDE 0.9 % (FLUSH) 0.9 %
10 SYRINGE (ML) INJECTION PRN
Status: DISCONTINUED | OUTPATIENT
Start: 2018-11-09 | End: 2018-11-12 | Stop reason: HOSPADM

## 2018-11-09 RX ORDER — ACETAMINOPHEN 325 MG/1
650 TABLET ORAL EVERY 6 HOURS PRN
Status: DISCONTINUED | OUTPATIENT
Start: 2018-11-09 | End: 2018-11-12 | Stop reason: HOSPADM

## 2018-11-09 RX ORDER — AMLODIPINE BESYLATE 2.5 MG/1
2.5 TABLET ORAL DAILY
Status: DISCONTINUED | OUTPATIENT
Start: 2018-11-09 | End: 2018-11-12 | Stop reason: HOSPADM

## 2018-11-09 RX ADMIN — ENOXAPARIN SODIUM 30 MG: 30 INJECTION SUBCUTANEOUS at 20:22

## 2018-11-09 RX ADMIN — DICLOFENAC 2 G: 10 GEL TOPICAL at 16:22

## 2018-11-09 RX ADMIN — Medication 10 ML: at 20:31

## 2018-11-09 RX ADMIN — ACETAMINOPHEN 650 MG: 325 TABLET ORAL at 20:17

## 2018-11-09 RX ADMIN — DICLOFENAC 4 G: 10 GEL TOPICAL at 20:11

## 2018-11-09 ASSESSMENT — PAIN DESCRIPTION - FREQUENCY
FREQUENCY: CONTINUOUS
FREQUENCY: CONTINUOUS

## 2018-11-09 ASSESSMENT — PAIN SCALES - GENERAL
PAINLEVEL_OUTOF10: 5
PAINLEVEL_OUTOF10: 2
PAINLEVEL_OUTOF10: 9
PAINLEVEL_OUTOF10: 9
PAINLEVEL_OUTOF10: 0

## 2018-11-09 ASSESSMENT — PAIN DESCRIPTION - ORIENTATION
ORIENTATION: MID;LOWER
ORIENTATION: LOWER

## 2018-11-09 ASSESSMENT — ENCOUNTER SYMPTOMS
SORE THROAT: 0
NAUSEA: 0
DIARRHEA: 0
ABDOMINAL PAIN: 0
VOMITING: 0
WHEEZING: 0
EYE DISCHARGE: 0
EYE PAIN: 0
SHORTNESS OF BREATH: 0
BACK PAIN: 1
COUGH: 0
RHINORRHEA: 0

## 2018-11-09 ASSESSMENT — PAIN DESCRIPTION - DESCRIPTORS
DESCRIPTORS: ACHING;BURNING;THROBBING
DESCRIPTORS: ACHING;THROBBING

## 2018-11-09 ASSESSMENT — PAIN DESCRIPTION - PROGRESSION: CLINICAL_PROGRESSION: NOT CHANGED

## 2018-11-09 ASSESSMENT — PAIN DESCRIPTION - PAIN TYPE
TYPE: CHRONIC PAIN
TYPE: CHRONIC PAIN

## 2018-11-09 ASSESSMENT — PAIN DESCRIPTION - LOCATION
LOCATION: BACK
LOCATION: BACK

## 2018-11-09 ASSESSMENT — PAIN DESCRIPTION - ONSET: ONSET: ON-GOING

## 2018-11-09 NOTE — ED PROVIDER NOTES
[facility]  eMERGENCY dEPARTMENT eNCOUnter          CHIEF COMPLAINT       Chief Complaint   Patient presents with    Back Pain       Nurses Notes reviewed and I agree except as noted in the HPI. HISTORY OF PRESENT ILLNESS    Mini Reyes is a 80 y.o. female who presents to the Emergency Department via EMS for the evaluation of back pain. The patient reports chronic back that worsened today. The patient was unable to walk due to the pain. The patient rates the pain as 9/10 and describes it as aching, burning, and throbbing. The patient reports taking Aspirin with no relief. The patient has been using a Lidoderm patch for 5 months with some relief. The patient reports numbness and tingling in her back. The patient denies numbness and tingling in her legs and incontinence. The patient reports she takes daily vitamins and Synthroid. The patient has no further symptoms or complaints at this time. The HPI wasprovided by the patient. REVIEW OF SYSTEMS     Review of Systems   Constitutional: Negative for appetite change, chills, fatigue and fever. HENT: Negative for congestion, ear pain, rhinorrhea and sore throat. Eyes: Negative for pain, discharge and visual disturbance. Respiratory: Negative for cough, shortness of breath and wheezing. Cardiovascular: Negative for chest pain, palpitations and leg swelling. Gastrointestinal: Negative for abdominal pain, diarrhea, nausea and vomiting. Genitourinary: Negative for difficulty urinating, dysuria, hematuria and vaginal discharge. Musculoskeletal: Positive for back pain. Negative for arthralgias, joint swelling and neck pain. Skin: Negative for pallor and rash. Neurological: Negative for dizziness, syncope, weakness, light-headedness, numbness and headaches. Hematological: Negative for adenopathy. Psychiatric/Behavioral: Negative for confusion and suicidal ideas. The patient is not nervous/anxious.         PAST interpreted by the Emergency Department Physician whoeither signs or Co-signs this chart in the absence of a cardiologist.  EKG interpreted by Vishnu Arechiga MD:    Vent. Rate: 97 bpm  SD interval: 140 ms  QRS duration: 100 ms  QTc: 403 ms  P-R-T axes: 54, -49, 135  Sinus rhythm with occasional premature ventricular complexes and premature atrial complexes. Left anterior fascicular block. Left ventricular hypertrophy with repolarization abnormality. Cannot rule out septal infarct. No STEMI  Compared to old EKG on 11-2-2018      RADIOLOGY: non-plain filmimages(s) such as CT, Ultrasound and MRI are read by the radiologist.    Khloe   Final Result   Advanced degenerative changes lumbar spine with lumbar scoliosis and chronic L1 compression fracture. No acute findings. **This report has been created using voice recognition software. It may contain minor errors which are inherent in voice recognition technology. **      Final report electronically signed by Dr. Bryce Rodríguez on 11/9/2018 3:35 PM      CT PELVIS WO CONTRAST Additional Contrast? None   Final Result   Moderate stool throughout the colon. Indeterminate left adnexal cyst.   Osteopenia and degenerative changes throughout the pelvis. No acute fracture identified. **This report has been created using voice recognition software. It may contain minor errors which are inherent in voice recognition technology. **      Final report electronically signed by Dr. Bryce Rodríguez on 11/9/2018 3:25 PM          LABS:   Labs Reviewed   CBC WITH AUTO DIFFERENTIAL - Abnormal; Notable for the following:        Result Value    RDW-SD 47.1 (*)     Monocytes # 0.3 (*)     All other components within normal limits   COMPREHENSIVE METABOLIC PANEL - Abnormal; Notable for the following:     ALT 10 (*)     All other components within normal limits   URINE WITH REFLEXED MICRO - Abnormal; Notable for the following:     Blood, Urine SMALL (*)     All other components within normal limits   GLOMERULAR FILTRATION RATE, ESTIMATED - Abnormal; Notable for the following:     Est, Glom Filt Rate 51 (*)     All other components within normal limits   SEDIMENTATION RATE   TSH WITHOUT REFLEX   T4, FREE   ANION GAP   OSMOLALITY       EMERGENCY DEPARTMENT COURSE:   Vitals:    Vitals:    11/09/18 1349 11/09/18 1507 11/09/18 1626   BP: (!) 147/77 (!) 143/75 (!) 143/76   Pulse: 80     Resp: 20     Temp: 98.5 °F (36.9 °C)     TempSrc: Oral     SpO2: 96%     Weight: 93 lb (42.2 kg)     Height: 5' (1.524 m)         1:59 PM: The patient was seen and evaluated. MDM:  Patient was given diclofenac gel or back pain. Patient's back pain is mostly in her SI joint. I talked to interventional radiology and patient is going to have her SI joint injection done tomorrow at noon. Since patient does have excruciating back pain. Patient will be admitted by  or other workup and management. All of patient's lab the testing that reviewed. Patient's CT scan of the pelvis and lumbar spine was reviewed. Patient did have some adnexal cyst on the left side, initially I ordered ultrasound as a follow-up for that stated by the family did not want to do any workup for that. CRITICAL CARE:   None     CONSULTS:  Dr Melanie Rai:  None     FINAL IMPRESSION      1. Strain of lumbar region, initial encounter    2. Uncontrolled pain          DISPOSITION/PLAN   admit    PATIENT REFERRED TO:  No follow-up provider specified.     DISCHARGE MEDICATIONS:  New Prescriptions    No medications on file       (Please note that portions of this note were completed with a voice recognition program.  Efforts were made toedit the dictations but occasionally words are mis-transcribed.)    Scribe:  Franco Nava 11/9/18 1:59 PM Scribing for and in the presence of Mai Sebastian MD.    Signed by: Alethea Raymond, 11/09/18 5:08 PM    Provider:  I personally performed the services described in the documentation, reviewed and edited thedocumentation which was dictated to the scribe in my presence, and it accurately records my words and actions.     Jonathon Milligan MD 11/9/18 5:08 PM                            Jonathon Milligan MD  11/09/18 5050

## 2018-11-09 NOTE — ED NOTES
Pt presents to ED by charly. Pt states that she has chronic back and states it is bad today. Son states he called the squad bc he is having a hard time taking care of her because he is not in good health. Pt states she broke her back a while back and she cannot get it under control.       [address], RN  11/09/18 4127

## 2018-11-10 ENCOUNTER — APPOINTMENT (OUTPATIENT)
Dept: INTERVENTIONAL RADIOLOGY/VASCULAR | Age: 83
DRG: 551 | End: 2018-11-10
Payer: MEDICARE

## 2018-11-10 PROBLEM — E43 SEVERE MALNUTRITION (HCC): Status: ACTIVE | Noted: 2018-11-10

## 2018-11-10 PROCEDURE — G8979 MOBILITY GOAL STATUS: HCPCS

## 2018-11-10 PROCEDURE — 6360000002 HC RX W HCPCS

## 2018-11-10 PROCEDURE — 6360000004 HC RX CONTRAST MEDICATION: Performed by: RADIOLOGY

## 2018-11-10 PROCEDURE — 2709999900 HC NON-CHARGEABLE SUPPLY

## 2018-11-10 PROCEDURE — 2580000003 HC RX 258: Performed by: INTERNAL MEDICINE

## 2018-11-10 PROCEDURE — 1200000000 HC SEMI PRIVATE

## 2018-11-10 PROCEDURE — 6370000000 HC RX 637 (ALT 250 FOR IP): Performed by: INTERNAL MEDICINE

## 2018-11-10 PROCEDURE — G8978 MOBILITY CURRENT STATUS: HCPCS

## 2018-11-10 PROCEDURE — 2500000003 HC RX 250 WO HCPCS: Performed by: RADIOLOGY

## 2018-11-10 PROCEDURE — 6360000002 HC RX W HCPCS: Performed by: RADIOLOGY

## 2018-11-10 PROCEDURE — 2500000003 HC RX 250 WO HCPCS

## 2018-11-10 PROCEDURE — 97116 GAIT TRAINING THERAPY: CPT

## 2018-11-10 PROCEDURE — 6360000002 HC RX W HCPCS: Performed by: INTERNAL MEDICINE

## 2018-11-10 PROCEDURE — 97162 PT EVAL MOD COMPLEX 30 MIN: CPT

## 2018-11-10 RX ORDER — METHYLPREDNISOLONE ACETATE 80 MG/ML
80 INJECTION, SUSPENSION INTRA-ARTICULAR; INTRALESIONAL; INTRAMUSCULAR; SOFT TISSUE ONCE
Status: COMPLETED | OUTPATIENT
Start: 2018-11-10 | End: 2018-11-10

## 2018-11-10 RX ORDER — METHYLPREDNISOLONE 4 MG/1
32 TABLET ORAL 2 TIMES DAILY
Status: COMPLETED | OUTPATIENT
Start: 2018-11-10 | End: 2018-11-10

## 2018-11-10 RX ORDER — BUPIVACAINE HYDROCHLORIDE 2.5 MG/ML
5 INJECTION, SOLUTION EPIDURAL; INFILTRATION; INTRACAUDAL ONCE
Status: COMPLETED | OUTPATIENT
Start: 2018-11-10 | End: 2018-11-10

## 2018-11-10 RX ADMIN — AMLODIPINE BESYLATE 2.5 MG: 2.5 TABLET ORAL at 08:43

## 2018-11-10 RX ADMIN — DICLOFENAC 4 G: 10 GEL TOPICAL at 08:44

## 2018-11-10 RX ADMIN — DICLOFENAC 4 G: 10 GEL TOPICAL at 21:25

## 2018-11-10 RX ADMIN — THERA TABS 1 TABLET: TAB at 08:43

## 2018-11-10 RX ADMIN — DICLOFENAC 4 G: 10 GEL TOPICAL at 13:00

## 2018-11-10 RX ADMIN — BUPIVACAINE HYDROCHLORIDE 4 ML: 2.5 INJECTION, SOLUTION EPIDURAL; INFILTRATION; INTRACAUDAL; PERINEURAL at 12:30

## 2018-11-10 RX ADMIN — ENOXAPARIN SODIUM 30 MG: 30 INJECTION SUBCUTANEOUS at 21:25

## 2018-11-10 RX ADMIN — IOHEXOL 2 ML: 180 INJECTION INTRAVENOUS at 12:30

## 2018-11-10 RX ADMIN — DICLOFENAC 4 G: 10 GEL TOPICAL at 15:57

## 2018-11-10 RX ADMIN — LEVOTHYROXINE SODIUM 50 MCG: 50 TABLET ORAL at 05:46

## 2018-11-10 RX ADMIN — METHYLPREDNISOLONE ACETATE 160 MG: 80 INJECTION, SUSPENSION INTRA-ARTICULAR; INTRALESIONAL; INTRAMUSCULAR; SOFT TISSUE at 12:30

## 2018-11-10 RX ADMIN — GABAPENTIN 100 MG: 100 CAPSULE ORAL at 05:46

## 2018-11-10 RX ADMIN — ACETAMINOPHEN 650 MG: 325 TABLET ORAL at 05:59

## 2018-11-10 RX ADMIN — METHYLPREDNISOLONE 32 MG: 4 TABLET ORAL at 05:46

## 2018-11-10 RX ADMIN — Medication 10 ML: at 08:44

## 2018-11-10 RX ADMIN — GABAPENTIN 100 MG: 100 CAPSULE ORAL at 21:24

## 2018-11-10 RX ADMIN — Medication 10 ML: at 21:25

## 2018-11-10 RX ADMIN — METHYLPREDNISOLONE 32 MG: 4 TABLET ORAL at 10:07

## 2018-11-10 RX ADMIN — GABAPENTIN 100 MG: 100 CAPSULE ORAL at 15:56

## 2018-11-10 ASSESSMENT — PAIN DESCRIPTION - ONSET
ONSET: ON-GOING
ONSET: ON-GOING

## 2018-11-10 ASSESSMENT — PAIN DESCRIPTION - DESCRIPTORS
DESCRIPTORS: ACHING
DESCRIPTORS: ACHING;CONSTANT

## 2018-11-10 ASSESSMENT — PAIN SCALES - GENERAL
PAINLEVEL_OUTOF10: 5
PAINLEVEL_OUTOF10: 6
PAINLEVEL_OUTOF10: 5
PAINLEVEL_OUTOF10: 5
PAINLEVEL_OUTOF10: 0
PAINLEVEL_OUTOF10: 4

## 2018-11-10 ASSESSMENT — PAIN DESCRIPTION - PROGRESSION
CLINICAL_PROGRESSION: NOT CHANGED
CLINICAL_PROGRESSION: NOT CHANGED

## 2018-11-10 ASSESSMENT — PAIN DESCRIPTION - PAIN TYPE
TYPE: CHRONIC PAIN
TYPE: CHRONIC PAIN;ACUTE PAIN
TYPE: CHRONIC PAIN

## 2018-11-10 ASSESSMENT — PAIN DESCRIPTION - LOCATION
LOCATION: BACK

## 2018-11-10 ASSESSMENT — PAIN DESCRIPTION - ORIENTATION
ORIENTATION: MID;LOWER
ORIENTATION: MID;LOWER

## 2018-11-10 ASSESSMENT — PAIN DESCRIPTION - FREQUENCY
FREQUENCY: CONTINUOUS
FREQUENCY: CONTINUOUS

## 2018-11-10 NOTE — PROGRESS NOTES
1204 Patient received in IR for right SI injection. Pt reporting bilateral lower back pain with pain worse on the right. 1208 This procedure has been fully reviewed with the patient and written informed consent has been obtained. 1210 Dr Rogelio Leiva in to speak with the pt.   65 Spoke with Dr Idania Jama, ordering physician, and new order received for bilateral SI injection based on pt's report of bilateral lower back pain. 1226 Procedure started with Dr. Rogelio Leiva. 1238 Procedure completed; patient tolerated well. Band aids x2 to lower back; no bleeding noted. 1240 Patient on cart; comfort ensured. [id number] Patient taken to [address] [place] [address] via cart.

## 2018-11-10 NOTE — PROGRESS NOTES
Formulation and discussion of sedation / procedure plans, risks, benefits, side effects and alternatives with patient and/or responsible adult completed.     Electronically signed by Adry Butt MD on 11/10/2018 at 12:42 PM

## 2018-11-10 NOTE — PLAN OF CARE
Problem: Nutrition  Goal: Optimal nutrition therapy  Outcome: Ongoing  Nutrition Problem: Severe malnutrition, In context of acute illness or injury  Intervention: Food and/or Nutrient Delivery: Continue current diet, Start ONS, Vitamin Supplement  Nutritional Goals: pt. will consume 75% or more at meals during LOS.

## 2018-11-10 NOTE — H&P
Internal Medicine  History and Physical    Patient:  Prashanth Mcknight  MRN: [id number]      History Obtained From:  patient, family member - son and daughter  PCP: Ernestina Shelton MD    CHIEF COMPLAINT:  Severe lower back pain    HISTORY OF PRESENT ILLNESS:   The patient is a 80 y.o. female who presents with severe lower back pain x 2 weeks. This started following a fall at home 2 weeks ago. Pain has been getting worse, on ASA, tylenol prn. She denies legs weakness. Seen in ED, CT L spine showed DDD with lumbar scoliosis and chronic L 1 compression fracture. The ER MD contacted IR and patient is being considered for nerve block / injection in am. She is poorly tolerant of opioids.     Past Medical History:        Diagnosis Date    GERD (gastroesophageal reflux disease)     History of blood transfusion     Osteoarthritis     Stenosis of cervical spine     Thyroid disease     Ulcer        Past Surgical History:        Procedure Laterality Date    BACK SURGERY      COLONOSCOPY      HYSTERECTOMY      SHOULDER SURGERY      UPPER GASTROINTESTINAL ENDOSCOPY         Medications Prior to Admission:    Prior to Admission medications    Medication Sig Start Date End Date Taking? Authorizing Provider   levothyroxine (SYNTHROID) 50 MCG tablet Take 1 tablet by mouth daily 6/12/18  Yes Ernestina Shelton MD   acetaminophen 650 MG TABS Take 650 mg by mouth every 4 hours as needed for Pain (For mild pain level 1-3 or for fever > 100.5). 4/28/13  Yes Kalyan Gunn MD   Multiple Vitamins-Minerals (VISION-PABLO PRESERVE PO) Take 1 tablet by mouth daily. Yes Historical Provider, MD   multivitamin [facility]) per tablet Take 1 tablet by mouth daily. Yes Historical Provider, MD   Cholecalciferol (VITAMIN D3) 3000 UNITS TABS Take  by mouth. Yes Historical Provider, MD       Allergies:  Nsaids; Codeine; Dye [iodides];  Lidocaine-epinephrine; Morphine; Procaine hcl; and Ultram [tramadol]    Social History:   TOBACCO: redness, warmth, or swelling of the joints  NEUROLOGIC:  Mental Status Exam:  Level of Alertness:   awake  Orientation:   person, place, time  Memory:   normal  Cranial Nerves:  cranial nerves II-XII are grossly intact  Motor Exam:  Motor exam is symmetrical 5 out of 5 all extremities bilaterally  SKIN:  normal skin color, texture, turgor      CBC:   Recent Labs      11/09/18   1422   WBC  5.3   HGB  13.5   PLT  254     BMP:    Recent Labs      11/09/18   1422   NA  138   K  4.3   CL  100   CO2  25   BUN  13   CREATININE  1.0   GLUCOSE  95     Hepatic:   Recent Labs      11/09/18   1422   AST  25   ALT  10*   BILITOT  0.3   ALKPHOS  74     CT L spine:  FINDINGS: Moderate hiatal hernia. Degenerative changes lumbar spine. No acute fracture. Moderate spinal stenosis L3-4 moderate to severe spinal stenosis L4-5 mild to moderate spinal stenosis at additional lumbar levels. This is likely related to scoliosis   and degenerative changes. Multilevel disc space narrowing. Lumbar scoliosis. Endplate osteophytes lumbar spine. No acute fracture or subluxation. Compression fracture at L1 which is chronic when compared to prior study. Impression:     Advanced degenerative changes lumbar spine with lumbar scoliosis and chronic L1 compression fracture. No acute findings. 11/09/18 1430     Glucose, Ur NEGATIVE mg/dl    Bilirubin Urine NEGATIVE    Ketones, Urine NEGATIVE    Specific Gravity, Urine 1.009    Blood, Urine SMALL (A)    pH, UA 7.0    Protein, UA NEGATIVE    Urobilinogen, Urine 0.2 eu/dl    Nitrite, Urine NEGATIVE    Leukocyte Esterase, Urine NEGATIVE    Color, UA YELLOW    Character, Urine CLEAR    RBC, UA 3-5 /hpf    WBC, UA 2-4 /hpf    Epi Cells 3-5 /hpf    Bacteria, UA NONE /hpf    Casts UA 4-8 HYALINE /lpf    Crystals NONE SEEN    Renal Epithelial, Urine NONE SEEN    Yeast, UA NONE SEEN    CASTS 2 NONE SEEN        Assessment and Plan   1. Intractable lower back pain  2.  Advanced DDD lumbar spine with lumbar scoliosis and L 1 compression fracture-chronic  3. HTN  4. Hypothyroidism. Analgesic-Voltaren gel, acetaminophen prn. MRI L spine r/o occult fracture. Lovenox. bp control. resume synthroid.     Patient Active Problem List   Diagnosis Code    Back pain M54.9    Hypothyroidism E03.9    Osteoarthritis M19.90    Lumbar spinal stenosis M48.061    Neurogenic urinary incontinence N39.498    Fecal incontinence R15.9    Allergic rhinitis J30.9    Hyperlipidemia E78.5    GERD (gastroesophageal reflux disease) K21.9    Paroxysmal atrial fibrillation (HCC) I48.0    Venous insufficiency of leg I87.2    Vitamin B 12 deficiency E53.8    CKD (chronic kidney disease), stage III (HCC) N18.3    Intractable back pain M54.9       Pauline Sawyer MD  Admitting Internist

## 2018-11-10 NOTE — PROGRESS NOTES
to imporve strength, balance ad endurance to imporve safety with gait. h/o fall. Decision Making: Moderate Complexitybased on patient assessment and decision making process of determining plan of care and establishing reasonable expectations for measurable functional outcomes    REQUIRES PT FOLLOW UP: Yes    Discharge Recommendations:  Discharge Recommendations: Continue to assess pending progress, Patient would benefit from continued therapy after discharge, [address]    Patient Education:  Patient Education: POC    Equipment Recommendations:  Equipment Needed: No    Safety:  Type of devices: Call light within reach, Gait belt, Nurse notified, All fall risk precautions in place, Left in chair  Restraints  Initially in place: No    Plan:  Times per week: 6 X O  Times per day: Daily  Specific instructions for Next Treatment: gait, mobility, endurance , balance   Current Treatment Recommendations: Strengthening, Balance Training, Functional Mobility Training, Transfer Training, Gait Training, Endurance Training, Home Exercise Program    Goals:  Patient goals : Go home and see her cat  Short term goals  Time Frame for Short term goals: 1 week  Short term goal 1: supine to sit and return with S to get in and out of bed   Short term goal 2: sit to stand with SBA to get on and off various surfaces  Short term goal 3: ambulate with  feet with SBA to walk safely in the home  Long term goals  Time Frame for Long term goals : NA due to short ELOS    Evaluation Complexity: Based on the findings of patient history, examination, clinical presentation, and decision making during this evaluation, the evaluation of Nhi Barksdale  is of medium complexity. PT G-Codes  Functional Limitation: Mobility: Walking and moving around  Mobility: Walking and Moving Around Current Status ():  At least 40 percent but less than 60 percent impaired, limited or restricted  Mobility: Walking and Moving

## 2018-11-10 NOTE — PROGRESS NOTES
Nutrition Assessment    Type and Reason for Visit: Initial, Positive Nutrition Screen (unplanned weight loss, poor po/appetite.)    Nutrition Recommendations:   MD to advise re: appetite stimulant. Continue current diet. Send ensure clear TID . Consider vitamin B12 level- hx of B12 deficiency noted. Nutrition Assessment: Pt. admitted with DDD with lumbar scoliosis & chronic l-=1 compression fracture. Pt. 8 years old & states she is forgetful. She denies chewing/swallowing difficulty. She is unable to answer most of my diet questions. Son is here & mentions pt. lives at home & has been eating <50% atleast 1 month. He mentions pt. lactose intolerant but can tolerate small amounts of milk to drink. etc. Pt. received home delivered meals. Lives a few houses down from daughter. Pt. denies chewing/swallowing difficulty. Doesn't remember what she ate for lunch. Meds include: Medrol, Depo-Medrol, MVI, Milk of Magnesia, Zofran. No BM noted- new pt. Malnutrition Assessment:  · Malnutrition Status: Meets the criteria for severe malnutrition  · Context: Acute illness or injury  · Findings of the 6 clinical characteristics of malnutrition (Minimum of 2 out of 6 clinical characteristics is required to make the diagnosis of moderate or severe Protein Calorie Malnutrition based on AND/ASPEN Guidelines):  1. Energy Intake-Less than 50% of estimated energy requirement for greater than or equal to 1 month, greater than or equal to 1 month    2. Weight Loss-20% loss or greater,  (4 1/2 years)  3. Fat Loss-Moderate subcutaneous fat loss, Orbital  4. Muscle Loss-Moderate muscle mass loss, Temples (temporalis muscle), Clavicles (pectoralis and deltoids)    Nutrition Risk Level: High    Nutrient Needs:  · Estimated Daily Total Kcal: 1298-0572 (35-40kcals/kgm wt. of 36.5kgm)  · Estimated Daily Protein (g): ~55-73 grams (1.5-2 grams /kgm wt.  of 36.5kgm)  Nutrition Diagnosis:   · Problem: Severe malnutrition, In context of acute illness or injury  · Etiology: related to Insufficient energy/nutrient consumption     Signs and symptoms:  as evidenced by Moderate muscle loss, Moderate loss of subcutaneous fat, Diet history of poor intake, Weight loss    Objective Information:  · Nutrition-Focused Physical Findings:  Cachexic appearing with atleast moderate muscle & fat loss. · Wound Type: None  · Current Nutrition Therapies:  · Oral Diet Orders: General   · Oral Diet intake:  (340ml po intake noted)  · Oral Nutrition Supplement (ONS) Orders: Clear Liquid Oral Supplement  · ONS intake:  (new)  · Anthropometric Measures:  · Ht: 5' 4\" (162.6 cm)   · Current Body Wt: 80 lb 7.5 oz (36.5 kg)  · Admission Body Wt: 80 lb 7.5 oz (36.5 kg) ((11/9))  · Usual Body Wt:  (per EMR: 118# 9.6oz ( 4/27/13))  · % Weight Change:  ,  32.2% wt. loss 4 1/2 years  · Ideal Body Wt: 120 lb (54.4 kg), % Ideal Body 67%  · BMI Classification: BMI <18.5 Underweight    Nutrition Interventions:   Continue current diet, Start ONS, Vitamin Supplement  Continued Inpatient Monitoring, Education Initiated (Encouraged 6 small meals/day ( ambassador to assist). Encouraged ONS TID.)    Nutrition Evaluation:   · Evaluation: Goals set   · Goals: pt. will consume 75% or more at meals during LOS.      · Monitoring: Meal Intake, Supplement Intake, Diet Tolerance, Skin Integrity, Mental Status/Confusion, Weight, Pertinent Labs, Chewing/Swallowing, Monitor Bowel Function      Electronically signed by Clemencia Morgan RD, LD on 11/10/18 at 3:35 PM    Contact Number: [phone]

## 2018-11-11 PROCEDURE — 97166 OT EVAL MOD COMPLEX 45 MIN: CPT

## 2018-11-11 PROCEDURE — G8987 SELF CARE CURRENT STATUS: HCPCS

## 2018-11-11 PROCEDURE — G8988 SELF CARE GOAL STATUS: HCPCS

## 2018-11-11 PROCEDURE — 1200000000 HC SEMI PRIVATE

## 2018-11-11 PROCEDURE — 6360000002 HC RX W HCPCS: Performed by: INTERNAL MEDICINE

## 2018-11-11 PROCEDURE — 97535 SELF CARE MNGMENT TRAINING: CPT

## 2018-11-11 PROCEDURE — 2580000003 HC RX 258: Performed by: INTERNAL MEDICINE

## 2018-11-11 PROCEDURE — 6370000000 HC RX 637 (ALT 250 FOR IP): Performed by: INTERNAL MEDICINE

## 2018-11-11 RX ADMIN — THERA TABS 1 TABLET: TAB at 08:05

## 2018-11-11 RX ADMIN — LEVOTHYROXINE SODIUM 50 MCG: 50 TABLET ORAL at 05:07

## 2018-11-11 RX ADMIN — HYDROMORPHONE HYDROCHLORIDE 0.25 MG: 1 INJECTION, SOLUTION INTRAMUSCULAR; INTRAVENOUS; SUBCUTANEOUS at 14:57

## 2018-11-11 RX ADMIN — ACETAMINOPHEN 650 MG: 325 TABLET ORAL at 05:23

## 2018-11-11 RX ADMIN — AMLODIPINE BESYLATE 2.5 MG: 2.5 TABLET ORAL at 08:05

## 2018-11-11 RX ADMIN — DICLOFENAC 4 G: 10 GEL TOPICAL at 08:05

## 2018-11-11 RX ADMIN — GABAPENTIN 100 MG: 100 CAPSULE ORAL at 20:02

## 2018-11-11 RX ADMIN — GABAPENTIN 100 MG: 100 CAPSULE ORAL at 05:07

## 2018-11-11 RX ADMIN — Medication 10 ML: at 20:02

## 2018-11-11 RX ADMIN — ENOXAPARIN SODIUM 30 MG: 30 INJECTION SUBCUTANEOUS at 20:07

## 2018-11-11 RX ADMIN — GABAPENTIN 100 MG: 100 CAPSULE ORAL at 14:58

## 2018-11-11 RX ADMIN — DICLOFENAC 4 G: 10 GEL TOPICAL at 11:48

## 2018-11-11 RX ADMIN — DICLOFENAC 4 G: 10 GEL TOPICAL at 20:02

## 2018-11-11 RX ADMIN — DICLOFENAC 4 G: 10 GEL TOPICAL at 16:47

## 2018-11-11 RX ADMIN — Medication 10 ML: at 08:05

## 2018-11-11 ASSESSMENT — PAIN DESCRIPTION - PROGRESSION
CLINICAL_PROGRESSION: NOT CHANGED

## 2018-11-11 ASSESSMENT — PAIN DESCRIPTION - LOCATION
LOCATION: BACK

## 2018-11-11 ASSESSMENT — PAIN DESCRIPTION - ORIENTATION
ORIENTATION: LOWER;MID
ORIENTATION: LOWER;MID
ORIENTATION: MID;LOWER
ORIENTATION: LOWER;MID
ORIENTATION: MID;LOWER

## 2018-11-11 ASSESSMENT — PAIN DESCRIPTION - PAIN TYPE
TYPE: CHRONIC PAIN

## 2018-11-11 ASSESSMENT — PAIN DESCRIPTION - FREQUENCY
FREQUENCY: CONTINUOUS

## 2018-11-11 ASSESSMENT — PAIN DESCRIPTION - ONSET
ONSET: ON-GOING

## 2018-11-11 ASSESSMENT — PAIN DESCRIPTION - DESCRIPTORS
DESCRIPTORS: ACHING
DESCRIPTORS: SHARP

## 2018-11-11 ASSESSMENT — PAIN SCALES - GENERAL
PAINLEVEL_OUTOF10: 8
PAINLEVEL_OUTOF10: 0
PAINLEVEL_OUTOF10: 4
PAINLEVEL_OUTOF10: 3
PAINLEVEL_OUTOF10: 3
PAINLEVEL_OUTOF10: 4
PAINLEVEL_OUTOF10: 5
PAINLEVEL_OUTOF10: 7
PAINLEVEL_OUTOF10: 1
PAINLEVEL_OUTOF10: 7

## 2018-11-11 NOTE — PROGRESS NOTES
AD at home to increase her balance and decrease her fall risk. Pt continued to state her house was small enough that she didn't need one. Activity Tolerance:  Activity Tolerance: Patient Tolerated treatment well  Activity Tolerance: Pt dmeo ADL tasks in bathroom with CGA and educaiton on safety with toilet transfers. Pt demo funcitonal mobility HH distance with RW and CGA with educaiton on use of AD at home to increse safety and decerase fall risk. Assessment:  Assessment: Pt admitted with back pain s/p nerve block. Pt demo decreased safety awareness and difficulty following through with educaiton provided throughout session. Pt requiring incresed assistance to complete ADL tasks safely. Pt would benefit from skilled OT Services to increase safety during ADL tasks and mobility as well as improve balnce during. Performance deficits / Impairments: Decreased endurance, Decreased ADL status, Decreased balance, Decreased safe awareness, Decreased cognition  Prognosis: Good    Clinical Decision Making: Clinical Decision making was of Moderate Complexity as the result of analysis of data from a detailed assessment, a consideration of several treatment options, the presence of comorbidities affecting the plan of care and the need for minimal to moderate modifications or assistance required to complete the evaluation. Discharge Recommendations:  Discharge Recommendations: Patient would benefit from continued therapy after discharge, Continue to assess pending progress, Home with Home health OT, [address]    Patient Education:  Patient Education: OT POC< safety with transfers   Barriers to Learning: cogntion     Equipment Recommendations:  Equipment Needed: No    Safety:  Safety Devices in place: Yes  Type of devices:  All fall risk precautions in place, Left in bed, Bed alarm in place, Nurse notified, Call light within

## 2018-11-11 NOTE — PLAN OF CARE
Problem: Pain:  Goal: Pain level will decrease  Pain level will decrease   Outcome: Ongoing  Patient had injections on her back this afternoon and denies pain since then. Pain medication is available on MAR as needed to control pain     Problem: Falls - Risk of:  Goal: Will remain free from falls  Will remain free from falls   Outcome: Ongoing  Patient bed alarm on, call light within reach. Pt ambulates well with assistance and walker    Problem: Infection:  Goal: Will remain free from infection  Will remain free from infection   Outcome: Ongoing  No s/s infection     Problem: Safety:  Goal: Free from accidental physical injury  Free from accidental physical injury   Outcome: Ongoing  Bed and chair alarms in room, call light within reach, patient up with staff assistance     Problem: Daily Care:  Goal: Daily care needs are met  Daily care needs are met   Outcome: Ongoing  Pt needs are met as they arise     Problem: Skin Integrity:  Goal: Skin integrity will stabilize  Skin integrity will stabilize   Outcome: Ongoing  2 surgical incision sites covered with bandaids    Problem: Discharge Planning:  Goal: Patients continuum of care needs are met  Patients continuum of care needs are met   Outcome: Ongoing  Patient and family are discussing patient discharge plans with social work, wanting home health, pt/ot     Care plan reviewed with patient. Patient verbalizes understanding of the plan of care and contribute to goal setting.

## 2018-11-12 ENCOUNTER — TELEPHONE (OUTPATIENT)
Dept: FAMILY MEDICINE CLINIC | Age: 83
End: 2018-11-12

## 2018-11-12 VITALS
DIASTOLIC BLOOD PRESSURE: 62 MMHG | HEART RATE: 66 BPM | SYSTOLIC BLOOD PRESSURE: 134 MMHG | HEIGHT: 64 IN | WEIGHT: 80.5 LBS | OXYGEN SATURATION: 95 % | RESPIRATION RATE: 18 BRPM | TEMPERATURE: 97.5 F | BODY MASS INDEX: 13.74 KG/M2

## 2018-11-12 PROCEDURE — 97110 THERAPEUTIC EXERCISES: CPT

## 2018-11-12 PROCEDURE — 2580000003 HC RX 258: Performed by: INTERNAL MEDICINE

## 2018-11-12 PROCEDURE — 97530 THERAPEUTIC ACTIVITIES: CPT

## 2018-11-12 PROCEDURE — 6370000000 HC RX 637 (ALT 250 FOR IP): Performed by: INTERNAL MEDICINE

## 2018-11-12 PROCEDURE — 97116 GAIT TRAINING THERAPY: CPT

## 2018-11-12 RX ORDER — AMLODIPINE BESYLATE 2.5 MG/1
2.5 TABLET ORAL DAILY
Qty: 30 TABLET | Refills: 3 | Status: SHIPPED | OUTPATIENT
Start: 2018-11-13 | End: 2018-12-12 | Stop reason: ALTCHOICE

## 2018-11-12 RX ORDER — GABAPENTIN 100 MG/1
100 CAPSULE ORAL EVERY 8 HOURS SCHEDULED
Qty: 90 CAPSULE | Refills: 3 | Status: SHIPPED | OUTPATIENT
Start: 2018-11-12 | End: 2019-03-12

## 2018-11-12 RX ADMIN — AMLODIPINE BESYLATE 2.5 MG: 2.5 TABLET ORAL at 09:57

## 2018-11-12 RX ADMIN — DICLOFENAC 4 G: 10 GEL TOPICAL at 13:05

## 2018-11-12 RX ADMIN — GABAPENTIN 100 MG: 100 CAPSULE ORAL at 05:44

## 2018-11-12 RX ADMIN — THERA TABS 1 TABLET: TAB at 09:57

## 2018-11-12 RX ADMIN — DICLOFENAC 4 G: 10 GEL TOPICAL at 09:57

## 2018-11-12 RX ADMIN — Medication 10 ML: at 09:57

## 2018-11-12 RX ADMIN — GABAPENTIN 100 MG: 100 CAPSULE ORAL at 14:02

## 2018-11-12 RX ADMIN — ACETAMINOPHEN 650 MG: 325 TABLET ORAL at 14:02

## 2018-11-12 RX ADMIN — LEVOTHYROXINE SODIUM 50 MCG: 50 TABLET ORAL at 05:44

## 2018-11-12 ASSESSMENT — PAIN SCALES - GENERAL
PAINLEVEL_OUTOF10: 7
PAINLEVEL_OUTOF10: 7
PAINLEVEL_OUTOF10: 5
PAINLEVEL_OUTOF10: 1

## 2018-11-12 ASSESSMENT — PAIN DESCRIPTION - PROGRESSION
CLINICAL_PROGRESSION: NOT CHANGED

## 2018-11-12 ASSESSMENT — PAIN DESCRIPTION - LOCATION
LOCATION: BACK
LOCATION: BACK

## 2018-11-12 ASSESSMENT — PAIN DESCRIPTION - PAIN TYPE
TYPE: CHRONIC PAIN
TYPE: CHRONIC PAIN

## 2018-11-12 ASSESSMENT — PAIN DESCRIPTION - ORIENTATION
ORIENTATION: LOWER
ORIENTATION: LOWER;MID

## 2018-11-12 NOTE — PROGRESS NOTES
Patient alert, awake and oriented. States pain is 6-9 on a scale of 0-10. Lower back pain. Sensory intact. Moderate hand grasp. Moderate pedal push. Strong pedal pull. Appropriate affect. Regular heart sounds. Cap refill and skin turgor more than 3 seconds. Radial pulses 2+ bilaterally and pedal pulses 2+ bilaterally. Post tibial pulses 1+ bilaterally. 1+ edema on lower extremities. Non labored breathing. Clear lung sounds heard anterior and posterior bilaterally. Soft abdomen and non tender. Bowel sounds heard in all four quadrants.

## 2018-11-12 NOTE — DISCHARGE INSTR - DIET

## 2018-11-12 NOTE — PROGRESS NOTES
chair  Restraints  Initially in place: No    Plan:  Times per week: 5-6 X O  Times per day: Daily  Specific instructions for Next Treatment: gait, mobility, endurance , balance   Current Treatment Recommendations: Strengthening, Balance Training, Functional Mobility Training, Transfer Training, Gait Training, Endurance Training, Home Exercise Program    Goals:  Patient goals : Go home and see her cat    Short term goals  Time Frame for Short term goals: 1 week  Short term goal 1: supine to sit and return with S to get in and out of bed   Short term goal 2: sit to stand with SBA to get on and off various surfaces  Short term goal 3: ambulate with  feet with SBA to walk safely in the home    Long term goals  Time Frame for Long term goals : NA due to short ELOS            AM-PAC Inpatient Mobility without Stair Climbing Raw Score : 15  AM-PAC Inpatient without Stair Climbing T-Scale Score : 43.03  Mobility Inpatient CMS 0-100% Score: 47.43  Mobility Inpatient without Stair CMS G-Code Modifier : CK

## 2018-11-13 ENCOUNTER — TELEPHONE (OUTPATIENT)
Dept: FAMILY MEDICINE CLINIC | Age: 83
End: 2018-11-13

## 2018-11-13 NOTE — TELEPHONE ENCOUNTER
[facility] Trans[facility] Initial Follow Up Call    Outreach made within 2 business days of discharge: Yes    Patient: Ashish Gaines Patient : 10/10/1915   MRN: [id number]  Reason for Admission: There are no discharge diagnoses documented for the most recent discharge. Discharge Date: 18       Spoke with: Cisco    [facility] Patient Contact:  Was patient able to fill all prescriptions: Yes  Was patient instructed to bring all medications to the follow-up visit: Yes  Is patient taking all medications as directed in the discharge summary? Yes  Does patient understand their discharge instructions: Yes  Does patient have questions or concerns that need addressed prior to 7-14 day follow up office visit: no    Patient concerned that she may not feel up to coming in for appointment next week. Advised patient to keep appointment for now and if she is not feeling up to it, she said that her kids or herself would call to reschedule. She wanted me to thank Dr. Jose R Malone for checking up on her.     Scheduled appointment with PCP within 7-14 days    Follow Up  Future Appointments  Date Time Provider Kathy Fuentes   2018 1:30 PM Jannet Dsouza MD [facility] [place]   2018 2:30 PM Jannet Dsouza MD [facility]       Remo Najjar, CMA ([address] [place] [address])

## 2018-11-21 ENCOUNTER — OFFICE VISIT (OUTPATIENT)
Dept: FAMILY MEDICINE CLINIC | Age: 83
End: 2018-11-21
Payer: MEDICARE

## 2018-11-21 VITALS
WEIGHT: 91 LBS | HEART RATE: 64 BPM | SYSTOLIC BLOOD PRESSURE: 110 MMHG | BODY MASS INDEX: 15.54 KG/M2 | DIASTOLIC BLOOD PRESSURE: 70 MMHG | HEIGHT: 64 IN

## 2018-11-21 DIAGNOSIS — M54.9 INTRACTABLE BACK PAIN: Primary | ICD-10-CM

## 2018-11-21 DIAGNOSIS — M53.3 PAIN OF BOTH SACROILIAC JOINTS: ICD-10-CM

## 2018-11-21 DIAGNOSIS — L72.9 SKIN CYST: ICD-10-CM

## 2018-11-21 PROCEDURE — 99495 TRANSJ CARE MGMT MOD F2F 14D: CPT | Performed by: FAMILY MEDICINE

## 2018-11-21 PROCEDURE — 1111F DSCHRG MED/CURRENT MED MERGE: CPT | Performed by: FAMILY MEDICINE

## 2018-11-21 ASSESSMENT — ENCOUNTER SYMPTOMS
BACK PAIN: 1
SHORTNESS OF BREATH: 1
WHEEZING: 0

## 2018-11-21 NOTE — PROGRESS NOTES
incontinence. Present at least a couple weeks. Getting home health. Nursing, PT and OT. They are going to look into assisted living. Son and daughter are present    Review of Systems   Respiratory: Positive for shortness of breath. Negative for wheezing. Cardiovascular: Negative for chest pain and leg swelling. Musculoskeletal: Positive for arthralgias, back pain and gait problem. Neurological: Negative for syncope and light-headedness. Vitals:    11/21/18 1326   BP: 110/70   Site: Left Upper Arm   Position: Sitting   Cuff Size: Small Adult   Pulse: 64   Weight: 91 lb (41.3 kg)   Height: 5' 4\" (1.626 m)     Body mass index is 15.62 kg/m². Wt Readings from Last 3 Encounters:   11/21/18 91 lb (41.3 kg)   11/09/18 80 lb 8 oz (36.5 kg)   06/14/18 93 lb 12.8 oz (42.5 kg)     BP Readings from Last 3 Encounters:   11/21/18 110/70   11/12/18 134/62   06/14/18 120/68       Physical Exam   Constitutional: She is oriented to person, place, and time. She appears cachectic. No distress. Cardiovascular: Normal rate and regular rhythm. Pulmonary/Chest: Effort normal and breath sounds normal. No respiratory distress. She has no wheezes. Neurological: She is alert and oriented to person, place, and time. Vitals reviewed. ttp of bilateral SI joints. Kyphosis of cervical spine. Genitalia:  Supine in frog legged position:  Exam of external genitalia shows a skin cyst of left anterior labia. Arlin and katrina are present for exam.      Assessment/Plan:  1. Intractable back pain  intractable back pain due to SI joint dysfunction. S/p injections. Improved. Continue voltaren gel. - [place] DISCHARGE MEDS RECONCILED W/ CURRENT OUTPATIENT MED LIST    2. Pain of both sacroiliac joints  As above    3. Skin cyst  Skin cyst of external genitalia. Monitor. No further eval.    Continue with home health including nursing, PT, OT. They will look into assisted living.     follow up in December as scheduled    Medical Decision Making: moderate complexity    Cherie Nageotte, MD

## 2018-11-27 ENCOUNTER — TELEPHONE (OUTPATIENT)
Dept: FAMILY MEDICINE CLINIC | Age: 83
End: 2018-11-27

## 2018-11-29 DIAGNOSIS — E03.9 ACQUIRED HYPOTHYROIDISM: ICD-10-CM

## 2018-11-29 RX ORDER — LEVOTHYROXINE SODIUM 0.05 MG/1
50 TABLET ORAL DAILY
Qty: 30 TABLET | Refills: 0 | Status: SHIPPED | OUTPATIENT
Start: 2018-11-29 | End: 2018-12-12 | Stop reason: SDUPTHER

## 2018-11-29 NOTE — TELEPHONE ENCOUNTER
Shaye Robson called requesting a refill on the following medications:  Requested Prescriptions     Pending Prescriptions Disp Refills    levothyroxine (SYNTHROID) 50 MCG tablet 30 tablet 0     Sig: Take 1 tablet by mouth daily       Date of last visit: 11/21/2018  Date of next visit (if applicable):12/12/2018  Date of last refill: 06/12/2018  Pharmacy Name: [facility],  [facility], [facility]

## 2018-12-12 ENCOUNTER — OFFICE VISIT (OUTPATIENT)
Dept: FAMILY MEDICINE CLINIC | Age: 83
End: 2018-12-12
Payer: MEDICARE

## 2018-12-12 VITALS
SYSTOLIC BLOOD PRESSURE: 122 MMHG | WEIGHT: 86.8 LBS | BODY MASS INDEX: 14.82 KG/M2 | HEIGHT: 64 IN | HEART RATE: 88 BPM | DIASTOLIC BLOOD PRESSURE: 82 MMHG

## 2018-12-12 DIAGNOSIS — Z23 INFLUENZA VACCINE NEEDED: ICD-10-CM

## 2018-12-12 DIAGNOSIS — E03.9 ACQUIRED HYPOTHYROIDISM: Primary | ICD-10-CM

## 2018-12-12 DIAGNOSIS — M54.9 INTRACTABLE BACK PAIN: ICD-10-CM

## 2018-12-12 DIAGNOSIS — E43 SEVERE MALNUTRITION (HCC): ICD-10-CM

## 2018-12-12 PROCEDURE — 1111F DSCHRG MED/CURRENT MED MERGE: CPT | Performed by: FAMILY MEDICINE

## 2018-12-12 PROCEDURE — 1101F PT FALLS ASSESS-DOCD LE1/YR: CPT | Performed by: FAMILY MEDICINE

## 2018-12-12 PROCEDURE — 90662 IIV NO PRSV INCREASED AG IM: CPT | Performed by: FAMILY MEDICINE

## 2018-12-12 PROCEDURE — 1123F ACP DISCUSS/DSCN MKR DOCD: CPT | Performed by: FAMILY MEDICINE

## 2018-12-12 PROCEDURE — 99214 OFFICE O/P EST MOD 30 MIN: CPT | Performed by: FAMILY MEDICINE

## 2018-12-12 PROCEDURE — 4040F PNEUMOC VAC/ADMIN/RCVD: CPT | Performed by: FAMILY MEDICINE

## 2018-12-12 PROCEDURE — G8419 CALC BMI OUT NRM PARAM NOF/U: HCPCS | Performed by: FAMILY MEDICINE

## 2018-12-12 PROCEDURE — G8427 DOCREV CUR MEDS BY ELIG CLIN: HCPCS | Performed by: FAMILY MEDICINE

## 2018-12-12 PROCEDURE — G0008 ADMIN INFLUENZA VIRUS VAC: HCPCS | Performed by: FAMILY MEDICINE

## 2018-12-12 PROCEDURE — G8482 FLU IMMUNIZE ORDER/ADMIN: HCPCS | Performed by: FAMILY MEDICINE

## 2018-12-12 PROCEDURE — 1090F PRES/ABSN URINE INCON ASSESS: CPT | Performed by: FAMILY MEDICINE

## 2018-12-12 PROCEDURE — 1036F TOBACCO NON-USER: CPT | Performed by: FAMILY MEDICINE

## 2018-12-12 RX ORDER — LEVOTHYROXINE SODIUM 0.05 MG/1
50 TABLET ORAL DAILY
Qty: 90 TABLET | Refills: 3 | Status: SHIPPED | OUTPATIENT
Start: 2018-12-12

## 2018-12-12 RX ORDER — MIRTAZAPINE 15 MG/1
15 TABLET, FILM COATED ORAL NIGHTLY
Qty: 90 TABLET | Refills: 0 | Status: SHIPPED | OUTPATIENT
Start: 2018-12-12 | End: 2018-12-18 | Stop reason: DRUGHIGH

## 2018-12-12 ASSESSMENT — ENCOUNTER SYMPTOMS
SHORTNESS OF BREATH: 0
WHEEZING: 0
BACK PAIN: 1

## 2018-12-12 NOTE — PROGRESS NOTES
[facility] PROFESSIONAL [facility]  [facility]  [address] [id number]  Dept: [phone]  Dept Fax: [phone]  Loc: [phone]  PROGRESS NOTE      Visit Date: 12/12/2018    Cheyenne Lynch is a 80 y.o. female who presents today for:  Chief Complaint   Patient presents with    6 Month Follow-Up    Hyperlipidemia    Hypothyroidism    Gastroesophageal Reflux       Subjective:  Hyperlipidemia   Pertinent negatives include no chest pain or shortness of breath. Gastroesophageal Reflux   She reports no chest pain or no wheezing. 6 month f/u    hypothyroidism: On synthroid 50 mcg. No symptoms. Low back pain:  Uses diclofenac gel and neurontin 100 mg 3x per day. Insurance will not be covering diclofenac. She is getting home health with PT. She wears a back brace. Low BMI:  Appetite is not good. Weight is down a few pounds. Mood is good. She has not been eating as much. She has been feeling full. Comes to office in wheelchair today. She pushes a wheelchair when at [address]. She uses walker in her apartment. No new concerns    Son and daughter are present    Moved into [facility] assisted living 2 weeks ago. Review of Systems   Respiratory: Negative for shortness of breath and wheezing. Cardiovascular: Negative for chest pain and leg swelling. Musculoskeletal: Positive for back pain and gait problem.      Past Medical History:   Diagnosis Date    GERD (gastroesophageal reflux disease)     History of blood transfusion     Osteoarthritis     Stenosis of cervical spine     Thyroid disease     Ulcer       Past Surgical History:   Procedure Laterality Date    BACK SURGERY      COLONOSCOPY      HYSTERECTOMY      SHOULDER SURGERY      UPPER GASTROINTESTINAL ENDOSCOPY       Family History   Problem Relation Age of Onset    Heart Disease Father     Early Death Father     Stroke Maternal Grandmother     Dementia Maternal

## 2018-12-18 ENCOUNTER — TELEPHONE (OUTPATIENT)
Dept: FAMILY MEDICINE CLINIC | Age: 83
End: 2018-12-18

## 2018-12-18 RX ORDER — MIRTAZAPINE 15 MG/1
7.5 TABLET, FILM COATED ORAL NIGHTLY
COMMUNITY
End: 2019-09-11 | Stop reason: ALTCHOICE

## 2019-01-07 ENCOUNTER — TELEPHONE (OUTPATIENT)
Dept: FAMILY MEDICINE CLINIC | Age: 84
End: 2019-01-07

## 2019-01-07 DIAGNOSIS — N30.00 ACUTE CYSTITIS WITHOUT HEMATURIA: Primary | ICD-10-CM

## 2019-01-07 RX ORDER — CEFDINIR 300 MG/1
300 CAPSULE ORAL 2 TIMES DAILY
Qty: 10 CAPSULE | Refills: 0 | Status: SHIPPED | OUTPATIENT
Start: 2019-01-07 | End: 2019-03-12 | Stop reason: ALTCHOICE

## 2019-03-12 ENCOUNTER — OFFICE VISIT (OUTPATIENT)
Dept: FAMILY MEDICINE CLINIC | Age: 84
End: 2019-03-12
Payer: MEDICARE

## 2019-03-12 VITALS
WEIGHT: 96.6 LBS | BODY MASS INDEX: 16.49 KG/M2 | HEART RATE: 64 BPM | DIASTOLIC BLOOD PRESSURE: 78 MMHG | SYSTOLIC BLOOD PRESSURE: 116 MMHG | HEIGHT: 64 IN

## 2019-03-12 DIAGNOSIS — N18.30 CHRONIC KIDNEY DISEASE, STAGE III (MODERATE) (HCC): ICD-10-CM

## 2019-03-12 DIAGNOSIS — L98.421 SACRAL ULCER, LIMITED TO BREAKDOWN OF SKIN (HCC): ICD-10-CM

## 2019-03-12 DIAGNOSIS — E44.1 MILD PROTEIN-CALORIE MALNUTRITION (HCC): ICD-10-CM

## 2019-03-12 DIAGNOSIS — R63.0 DECREASED APPETITE: Primary | ICD-10-CM

## 2019-03-12 DIAGNOSIS — E43 SEVERE MALNUTRITION (HCC): ICD-10-CM

## 2019-03-12 PROCEDURE — 1036F TOBACCO NON-USER: CPT | Performed by: FAMILY MEDICINE

## 2019-03-12 PROCEDURE — G8419 CALC BMI OUT NRM PARAM NOF/U: HCPCS | Performed by: FAMILY MEDICINE

## 2019-03-12 PROCEDURE — G8482 FLU IMMUNIZE ORDER/ADMIN: HCPCS | Performed by: FAMILY MEDICINE

## 2019-03-12 PROCEDURE — G8510 SCR DEP NEG, NO PLAN REQD: HCPCS | Performed by: FAMILY MEDICINE

## 2019-03-12 PROCEDURE — 99213 OFFICE O/P EST LOW 20 MIN: CPT | Performed by: FAMILY MEDICINE

## 2019-03-12 PROCEDURE — 1123F ACP DISCUSS/DSCN MKR DOCD: CPT | Performed by: FAMILY MEDICINE

## 2019-03-12 PROCEDURE — 1090F PRES/ABSN URINE INCON ASSESS: CPT | Performed by: FAMILY MEDICINE

## 2019-03-12 PROCEDURE — G8427 DOCREV CUR MEDS BY ELIG CLIN: HCPCS | Performed by: FAMILY MEDICINE

## 2019-03-12 PROCEDURE — 4040F PNEUMOC VAC/ADMIN/RCVD: CPT | Performed by: FAMILY MEDICINE

## 2019-03-12 PROCEDURE — 1101F PT FALLS ASSESS-DOCD LE1/YR: CPT | Performed by: FAMILY MEDICINE

## 2019-03-12 ASSESSMENT — PATIENT HEALTH QUESTIONNAIRE - PHQ9
SUM OF ALL RESPONSES TO PHQ QUESTIONS 1-9: 0
2. FEELING DOWN, DEPRESSED OR HOPELESS: 0
SUM OF ALL RESPONSES TO PHQ QUESTIONS 1-9: 0
1. LITTLE INTEREST OR PLEASURE IN DOING THINGS: 0
SUM OF ALL RESPONSES TO PHQ9 QUESTIONS 1 & 2: 0

## 2019-06-20 ENCOUNTER — OFFICE VISIT (OUTPATIENT)
Dept: FAMILY MEDICINE CLINIC | Age: 84
End: 2019-06-20
Payer: MEDICARE

## 2019-06-20 ENCOUNTER — HOSPITAL ENCOUNTER (OUTPATIENT)
Age: 84
Discharge: HOME | End: 2019-06-20
Payer: MEDICARE

## 2019-06-20 VITALS
DIASTOLIC BLOOD PRESSURE: 52 MMHG | HEIGHT: 64 IN | SYSTOLIC BLOOD PRESSURE: 118 MMHG | BODY MASS INDEX: 17.99 KG/M2 | HEART RATE: 80 BPM | WEIGHT: 105.4 LBS

## 2019-06-20 DIAGNOSIS — E03.9 ACQUIRED HYPOTHYROIDISM: ICD-10-CM

## 2019-06-20 DIAGNOSIS — E43 SEVERE MALNUTRITION (HCC): Primary | ICD-10-CM

## 2019-06-20 DIAGNOSIS — H61.23 BILATERAL IMPACTED CERUMEN: ICD-10-CM

## 2019-06-20 LAB — TSH SERPL DL<=0.05 MIU/L-ACNC: 2.02 UIU/ML (ref 0.4–4.2)

## 2019-06-20 PROCEDURE — 36415 COLL VENOUS BLD VENIPUNCTURE: CPT

## 2019-06-20 PROCEDURE — 84443 ASSAY THYROID STIM HORMONE: CPT

## 2019-06-20 PROCEDURE — G8419 CALC BMI OUT NRM PARAM NOF/U: HCPCS | Performed by: FAMILY MEDICINE

## 2019-06-20 PROCEDURE — 4040F PNEUMOC VAC/ADMIN/RCVD: CPT | Performed by: FAMILY MEDICINE

## 2019-06-20 PROCEDURE — 1090F PRES/ABSN URINE INCON ASSESS: CPT | Performed by: FAMILY MEDICINE

## 2019-06-20 PROCEDURE — 1123F ACP DISCUSS/DSCN MKR DOCD: CPT | Performed by: FAMILY MEDICINE

## 2019-06-20 PROCEDURE — G8427 DOCREV CUR MEDS BY ELIG CLIN: HCPCS | Performed by: FAMILY MEDICINE

## 2019-06-20 PROCEDURE — 99214 OFFICE O/P EST MOD 30 MIN: CPT | Performed by: FAMILY MEDICINE

## 2019-06-20 PROCEDURE — 1036F TOBACCO NON-USER: CPT | Performed by: FAMILY MEDICINE

## 2019-06-20 ASSESSMENT — ENCOUNTER SYMPTOMS: BACK PAIN: 1

## 2019-06-20 NOTE — PROGRESS NOTES
[facility] PROFESSIONAL SERVS  [facility]  [address] [id number]  Dept: [phone]  Dept Fax: [phone]  Loc: [phone]  PROGRESS NOTE      Visit Date: 6/20/2019    Charisse Andres is a 80 y.o. female who presents today for:  Chief Complaint   Patient presents with    Hyperlipidemia     check up    Ear Fullness       Subjective:  HPI    3 month f/u  Weight loss: On remeron. Weight is up 9 lbs in 3 months. Eating well at [facility]. Goes down for meals 3x per day. Uses walker. Bilateral ear fullness. Having problems hearing. No pain. Hypothyroidism: On synthroid 50 mcg. No symptoms but she has had a significant weight gain in the past 6 months. last TSH was about 9 months ago    In [facility]. Review of Systems   Constitutional: Negative for chills and fever. HENT: Negative for ear discharge and ear pain. Musculoskeletal: Positive for back pain and gait problem. Psychiatric/Behavioral: Negative for sleep disturbance. The patient is not nervous/anxious.       Patient Active Problem List   Diagnosis    Back pain    Hypothyroidism    Osteoarthritis    Lumbar spinal stenosis    Neurogenic urinary incontinence    Fecal incontinence    Allergic rhinitis    Hyperlipidemia    GERD (gastroesophageal reflux disease)    Paroxysmal atrial fibrillation (HCC)    Venous insufficiency of leg    Vitamin B 12 deficiency    CKD (chronic kidney disease), stage III (HCC)    Intractable back pain    Mild protein-calorie malnutrition (HCC)     Past Medical History:   Diagnosis Date    GERD (gastroesophageal reflux disease)     History of blood transfusion     Osteoarthritis     Stenosis of cervical spine     Thyroid disease     Ulcer       Past Surgical History:   Procedure Laterality Date    BACK SURGERY      COLONOSCOPY      HYSTERECTOMY      SHOULDER SURGERY      UPPER GASTROINTESTINAL ENDOSCOPY       Family History   Problem Relation Age of Onset    Heart Disease Father     Early Death Father     Stroke Maternal Grandmother     Dementia Maternal Grandfather     Diabetes Paternal Grandmother     Diabetes Paternal Grandfather     Diabetes Maternal Aunt     Heart Disease Maternal Aunt     Heart Disease Maternal Uncle     Diabetes Paternal Aunt     Diabetes Paternal Uncle      Social History     Tobacco Use    Smoking status: Former Smoker     Last attempt to quit: 1976     Years since quittin.4    Smokeless tobacco: Never Used   Substance Use Topics    Alcohol use: No      Current Outpatient Medications   Medication Sig Dispense Refill    mirtazapine (REMERON) 15 MG tablet Take 7.5 mg by mouth nightly      diclofenac sodium 1 % GEL Apply 2 g topically 4 times daily as needed for Pain 1 Tube 3    levothyroxine (SYNTHROID) 50 MCG tablet Take 1 tablet by mouth daily 90 tablet 3    diclofenac sodium 1 % GEL Apply 4 g topically 4 times daily 100 g 3    acetaminophen 650 MG TABS Take 650 mg by mouth every 4 hours as needed for Pain (For mild pain level 1-3 or for fever > 100.5). 30 tablet 4    Multiple Vitamins-Minerals (VISION-PABLO PRESERVE PO) Take 1 tablet by mouth daily.  multivitamin (THERAGRAN) per tablet Take 1 tablet by mouth daily.  Cholecalciferol (VITAMIN D3) 3000 UNITS TABS Take  by mouth. No current facility-administered medications for this visit.       Allergies   Allergen Reactions    Nsaids Other (See Comments)     Bleeding gastric ulcer    Codeine Other (See Comments)     Pass out    Dye [Iodides] Hives    Lidocaine-Epinephrine      Fast and irregular heart rate    Morphine Other (See Comments)     Makes her \"out of her head\"    Procaine Hcl      Pass out    Ultram [Tramadol] Anxiety     Health Maintenance   Topic Date Due    DTaP/Tdap/Td vaccine (1 - Tdap) 1934    Shingles Vaccine (1 of 2) 10/10/1965    Annual Wellness Visit words are mis-transcribed.)    Return in about 3 months (around 9/20/2019) for weight loss. .      Electronically signed by Zainab Daniel MD on 6/20/2019 at 2:30 PM

## 2019-06-20 NOTE — PROGRESS NOTES
Bilateral ear lavage performed. Removed a large amount of dried skin and wax from both ear canals. Dr Gregory Founds rechecked and more removed form the right. Patient tolerated this well.

## 2019-06-21 ENCOUNTER — TELEPHONE (OUTPATIENT)
Dept: FAMILY MEDICINE CLINIC | Age: 84
End: 2019-06-21

## 2019-06-21 NOTE — TELEPHONE ENCOUNTER
----- Message from Siva Ragsdale MD sent at 6/21/2019  7:18 AM EDT -----  Good. No dose change needed for synthroid. Please advise patient.   Siva Ragsdale MD

## 2019-09-11 ENCOUNTER — OFFICE VISIT (OUTPATIENT)
Dept: FAMILY MEDICINE CLINIC | Age: 84
End: 2019-09-11
Payer: MEDICARE

## 2019-09-11 VITALS
SYSTOLIC BLOOD PRESSURE: 124 MMHG | HEART RATE: 64 BPM | DIASTOLIC BLOOD PRESSURE: 74 MMHG | WEIGHT: 103.3 LBS | BODY MASS INDEX: 17.64 KG/M2 | HEIGHT: 64 IN

## 2019-09-11 DIAGNOSIS — R53.83 TIREDNESS: ICD-10-CM

## 2019-09-11 DIAGNOSIS — I48.0 PAROXYSMAL ATRIAL FIBRILLATION (HCC): ICD-10-CM

## 2019-09-11 DIAGNOSIS — M54.9 INTRACTABLE BACK PAIN: Primary | ICD-10-CM

## 2019-09-11 DIAGNOSIS — E44.1 MILD PROTEIN-CALORIE MALNUTRITION (HCC): ICD-10-CM

## 2019-09-11 PROCEDURE — 1036F TOBACCO NON-USER: CPT | Performed by: FAMILY MEDICINE

## 2019-09-11 PROCEDURE — G8419 CALC BMI OUT NRM PARAM NOF/U: HCPCS | Performed by: FAMILY MEDICINE

## 2019-09-11 PROCEDURE — G8427 DOCREV CUR MEDS BY ELIG CLIN: HCPCS | Performed by: FAMILY MEDICINE

## 2019-09-11 PROCEDURE — 96372 THER/PROPH/DIAG INJ SC/IM: CPT | Performed by: FAMILY MEDICINE

## 2019-09-11 PROCEDURE — 99213 OFFICE O/P EST LOW 20 MIN: CPT | Performed by: FAMILY MEDICINE

## 2019-09-11 PROCEDURE — 1090F PRES/ABSN URINE INCON ASSESS: CPT | Performed by: FAMILY MEDICINE

## 2019-09-11 PROCEDURE — 4040F PNEUMOC VAC/ADMIN/RCVD: CPT | Performed by: FAMILY MEDICINE

## 2019-09-11 PROCEDURE — 1123F ACP DISCUSS/DSCN MKR DOCD: CPT | Performed by: FAMILY MEDICINE

## 2019-09-11 RX ORDER — METHYLPREDNISOLONE ACETATE 40 MG/ML
40 INJECTION, SUSPENSION INTRA-ARTICULAR; INTRALESIONAL; INTRAMUSCULAR; SOFT TISSUE ONCE
Status: COMPLETED | OUTPATIENT
Start: 2019-09-11 | End: 2019-09-11

## 2019-09-11 RX ORDER — MIRTAZAPINE 7.5 MG/1
7.5 TABLET, FILM COATED ORAL NIGHTLY
Qty: 30 TABLET | Refills: 0 | Status: SHIPPED | OUTPATIENT
Start: 2019-09-11

## 2019-09-11 RX ADMIN — METHYLPREDNISOLONE ACETATE 40 MG: 40 INJECTION, SUSPENSION INTRA-ARTICULAR; INTRALESIONAL; INTRAMUSCULAR; SOFT TISSUE at 14:35

## 2019-09-11 ASSESSMENT — ENCOUNTER SYMPTOMS: BACK PAIN: 1

## 2019-09-11 NOTE — PROGRESS NOTES
Problem Relation Age of Onset    Heart Disease Father     Early Death Father     Stroke Maternal Grandmother     Dementia Maternal Grandfather     Diabetes Paternal Grandmother     Diabetes Paternal Grandfather     Diabetes Maternal Aunt     Heart Disease Maternal Aunt     Heart Disease Maternal Uncle     Diabetes Paternal Aunt     Diabetes Paternal Uncle      Social History     Tobacco Use    Smoking status: Former Smoker     Last attempt to quit: 1976     Years since quittin.7    Smokeless tobacco: Never Used   Substance Use Topics    Alcohol use: No      Current Outpatient Medications   Medication Sig Dispense Refill    mirtazapine (REMERON) 15 MG tablet Take 7.5 mg by mouth nightly      diclofenac sodium 1 % GEL Apply 2 g topically 4 times daily as needed for Pain 1 Tube 3    levothyroxine (SYNTHROID) 50 MCG tablet Take 1 tablet by mouth daily 90 tablet 3    diclofenac sodium 1 % GEL Apply 4 g topically 4 times daily 100 g 3    acetaminophen 650 MG TABS Take 650 mg by mouth every 4 hours as needed for Pain (For mild pain level 1-3 or for fever > 100.5). 30 tablet 4    Multiple Vitamins-Minerals (VISION-PABLO PRESERVE PO) Take 1 tablet by mouth daily.  multivitamin (THERAGRAN) per tablet Take 1 tablet by mouth daily.  Cholecalciferol (VITAMIN D3) 3000 UNITS TABS Take  by mouth. No current facility-administered medications for this visit.       Allergies   Allergen Reactions    Nsaids Other (See Comments)     Bleeding gastric ulcer    Codeine Other (See Comments)     Pass out    Dye [Iodides] Hives    Lidocaine-Epinephrine      Fast and irregular heart rate    Morphine Other (See Comments)     Makes her \"out of her head\"    Procaine Hcl      Pass out    Ultram [Tramadol] Anxiety     Health Maintenance   Topic Date Due    Pneumococcal 65+ years Vaccine (1 of 2 - PCV13) 10/10/1980    Annual Wellness Visit (AWV)  2019    Flu vaccine (1) 2019   

## 2019-10-07 ENCOUNTER — TELEPHONE (OUTPATIENT)
Dept: FAMILY MEDICINE CLINIC | Age: 84
End: 2019-10-07

## 2019-10-07 DIAGNOSIS — M54.9 INTRACTABLE BACK PAIN: Primary | ICD-10-CM

## 2019-10-07 RX ORDER — TIZANIDINE 2 MG/1
2 TABLET ORAL 3 TIMES DAILY PRN
Qty: 30 TABLET | Refills: 0 | Status: SHIPPED | OUTPATIENT
Start: 2019-10-07

## 2019-10-31 ENCOUNTER — OUTSIDE SERVICES (OUTPATIENT)
Dept: FAMILY MEDICINE CLINIC | Age: 84
End: 2019-10-31
Payer: MEDICARE

## 2019-10-31 VITALS
DIASTOLIC BLOOD PRESSURE: 56 MMHG | SYSTOLIC BLOOD PRESSURE: 124 MMHG | OXYGEN SATURATION: 94 % | TEMPERATURE: 98.2 F | RESPIRATION RATE: 16 BRPM | HEART RATE: 72 BPM

## 2019-10-31 DIAGNOSIS — E53.8 VITAMIN B 12 DEFICIENCY: Chronic | ICD-10-CM

## 2019-10-31 DIAGNOSIS — E44.1 MILD PROTEIN-CALORIE MALNUTRITION (HCC): ICD-10-CM

## 2019-10-31 DIAGNOSIS — I48.0 PAROXYSMAL ATRIAL FIBRILLATION (HCC): Chronic | ICD-10-CM

## 2019-10-31 DIAGNOSIS — E03.9 ACQUIRED HYPOTHYROIDISM: ICD-10-CM

## 2019-10-31 DIAGNOSIS — M47.896 OTHER OSTEOARTHRITIS OF SPINE, LUMBAR REGION: Primary | Chronic | ICD-10-CM

## 2019-10-31 DIAGNOSIS — N18.30 CKD (CHRONIC KIDNEY DISEASE), STAGE III (HCC): ICD-10-CM

## 2019-10-31 PROCEDURE — 99305 1ST NF CARE MODERATE MDM 35: CPT | Performed by: NURSE PRACTITIONER

## 2019-10-31 RX ORDER — VITAMIN B COMPLEX
1 CAPSULE ORAL DAILY
COMMUNITY

## 2019-10-31 RX ORDER — GABAPENTIN 100 MG/1
100 CAPSULE ORAL 3 TIMES DAILY
COMMUNITY

## 2019-10-31 ASSESSMENT — ENCOUNTER SYMPTOMS
CONSTIPATION: 0
ABDOMINAL PAIN: 0
TROUBLE SWALLOWING: 0
NAUSEA: 0
SHORTNESS OF BREATH: 1
BACK PAIN: 1
VOMITING: 0
RHINORRHEA: 0
EYE REDNESS: 0
WHEEZING: 0
COUGH: 0
SORE THROAT: 0
DIARRHEA: 0

## 2019-11-08 ENCOUNTER — HOSPITAL ENCOUNTER (EMERGENCY)
Age: 84
Discharge: HOME | End: 2019-11-08
Attending: FAMILY MEDICINE
Payer: MEDICARE

## 2019-11-08 VITALS
DIASTOLIC BLOOD PRESSURE: 70 MMHG | RESPIRATION RATE: 19 BRPM | OXYGEN SATURATION: 96 % | TEMPERATURE: 98.7 F | SYSTOLIC BLOOD PRESSURE: 152 MMHG | HEART RATE: 73 BPM

## 2019-11-08 DIAGNOSIS — R03.0 ELEVATED BLOOD PRESSURE READING: ICD-10-CM

## 2019-11-08 DIAGNOSIS — D50.8 OTHER IRON DEFICIENCY ANEMIA: Primary | ICD-10-CM

## 2019-11-08 LAB
ABO: NORMAL
ALBUMIN SERPL-MCNC: 4.1 G/DL (ref 3.5–5.1)
ALP BLD-CCNC: 78 U/L (ref 38–126)
ALT SERPL-CCNC: 9 U/L (ref 11–66)
ANION GAP SERPL CALCULATED.3IONS-SCNC: 13 MEQ/L (ref 8–16)
ANTIBODY SCREEN: NORMAL
AST SERPL-CCNC: 22 U/L (ref 5–40)
ATYPICAL LYMPHOCYTES: ABNORMAL %
BASOPHILS # BLD: 1.4 %
BASOPHILS ABSOLUTE: 0.1 THOU/MM3 (ref 0–0.1)
BILIRUB SERPL-MCNC: < 0.2 MG/DL (ref 0.3–1.2)
BILIRUBIN DIRECT: < 0.2 MG/DL (ref 0–0.3)
BUN BLDV-MCNC: 23 MG/DL (ref 7–22)
CALCIUM SERPL-MCNC: 9.3 MG/DL (ref 8.5–10.5)
CHLORIDE BLD-SCNC: 109 MEQ/L (ref 98–111)
CO2: 23 MEQ/L (ref 23–33)
CREAT SERPL-MCNC: 1 MG/DL (ref 0.4–1.2)
EOSINOPHIL # BLD: 1.7 %
EOSINOPHILS ABSOLUTE: 0.1 THOU/MM3 (ref 0–0.4)
ERYTHROCYTE [DISTWIDTH] IN BLOOD BY AUTOMATED COUNT: 20.3 % (ref 11.5–14.5)
ERYTHROCYTE [DISTWIDTH] IN BLOOD BY AUTOMATED COUNT: 54.3 FL (ref 35–45)
GFR SERPL CREATININE-BSD FRML MDRD: 51 ML/MIN/1.73M2
GLUCOSE BLD-MCNC: 97 MG/DL (ref 70–108)
HCT VFR BLD CALC: 27.3 % (ref 37–47)
HEMOCCULT STL QL: NEGATIVE
HEMOGLOBIN: 7.7 GM/DL (ref 12–16)
HYPOCHROMIA: PRESENT
IMMATURE GRANS (ABS): 0.01 THOU/MM3 (ref 0–0.07)
IMMATURE GRANULOCYTES: 0.2 %
LIPASE: 34.9 U/L (ref 5.6–51.3)
LYMPHOCYTES # BLD: 40.1 %
LYMPHOCYTES ABSOLUTE: 1.7 THOU/MM3 (ref 1–4.8)
MCH RBC QN AUTO: 21 PG (ref 26–33)
MCHC RBC AUTO-ENTMCNC: 28.2 GM/DL (ref 32.2–35.5)
MCV RBC AUTO: 74.6 FL (ref 81–99)
MONOCYTES # BLD: 10 %
MONOCYTES ABSOLUTE: 0.4 THOU/MM3 (ref 0.4–1.3)
NUCLEATED RED BLOOD CELLS: 1 /100 WBC
OSMOLALITY CALCULATION: 292.3 MOSMOL/KG (ref 275–300)
PLATELET # BLD: 314 THOU/MM3 (ref 130–400)
PMV BLD AUTO: 10 FL (ref 9.4–12.4)
POIKILOCYTES: ABNORMAL
POTASSIUM SERPL-SCNC: 4.4 MEQ/L (ref 3.5–5.2)
RBC # BLD: 3.66 MILL/MM3 (ref 4.2–5.4)
RH FACTOR: NORMAL
SCAN OF BLOOD SMEAR: NORMAL
SEG NEUTROPHILS: 46.6 %
SEGMENTED NEUTROPHILS ABSOLUTE COUNT: 2 THOU/MM3 (ref 1.8–7.7)
SODIUM BLD-SCNC: 145 MEQ/L (ref 135–145)
TARGET CELLS: ABNORMAL
TOTAL PROTEIN: 7 G/DL (ref 6.1–8)
WBC # BLD: 4.2 THOU/MM3 (ref 4.8–10.8)

## 2019-11-08 PROCEDURE — 99282 EMERGENCY DEPT VISIT SF MDM: CPT

## 2019-11-08 PROCEDURE — 85025 COMPLETE CBC W/AUTO DIFF WBC: CPT

## 2019-11-08 PROCEDURE — 80053 COMPREHEN METABOLIC PANEL: CPT

## 2019-11-08 PROCEDURE — 83690 ASSAY OF LIPASE: CPT

## 2019-11-08 PROCEDURE — 86901 BLOOD TYPING SEROLOGIC RH(D): CPT

## 2019-11-08 PROCEDURE — 86850 RBC ANTIBODY SCREEN: CPT

## 2019-11-08 PROCEDURE — 82248 BILIRUBIN DIRECT: CPT

## 2019-11-08 PROCEDURE — 2709999900 HC NON-CHARGEABLE SUPPLY

## 2019-11-08 PROCEDURE — 82272 OCCULT BLD FECES 1-3 TESTS: CPT

## 2019-11-08 PROCEDURE — 36415 COLL VENOUS BLD VENIPUNCTURE: CPT

## 2019-11-08 PROCEDURE — 86900 BLOOD TYPING SEROLOGIC ABO: CPT

## 2019-11-08 RX ORDER — PANTOPRAZOLE SODIUM 40 MG/10ML
40 INJECTION, POWDER, LYOPHILIZED, FOR SOLUTION INTRAVENOUS ONCE
Status: DISCONTINUED | OUTPATIENT
Start: 2019-11-08 | End: 2019-11-08

## 2019-11-08 ASSESSMENT — ENCOUNTER SYMPTOMS
NAUSEA: 0
BACK PAIN: 0
VOMITING: 0
RHINORRHEA: 0
WHEEZING: 0
EYE PAIN: 0
DIARRHEA: 0
ABDOMINAL PAIN: 0
COUGH: 0
EYE DISCHARGE: 0
SORE THROAT: 0
SHORTNESS OF BREATH: 0

## 2019-11-26 ENCOUNTER — OUTSIDE SERVICES (OUTPATIENT)
Dept: FAMILY MEDICINE CLINIC | Age: 84
End: 2019-11-26
Payer: MEDICARE

## 2019-11-26 DIAGNOSIS — M47.896 OTHER OSTEOARTHRITIS OF SPINE, LUMBAR REGION: Primary | ICD-10-CM

## 2019-11-26 DIAGNOSIS — E53.8 VITAMIN B 12 DEFICIENCY: ICD-10-CM

## 2019-11-26 DIAGNOSIS — N18.30 CKD (CHRONIC KIDNEY DISEASE), STAGE III (HCC): ICD-10-CM

## 2019-11-26 DIAGNOSIS — E44.1 MILD PROTEIN-CALORIE MALNUTRITION (HCC): ICD-10-CM

## 2019-11-26 DIAGNOSIS — I48.0 PAROXYSMAL ATRIAL FIBRILLATION (HCC): ICD-10-CM

## 2019-11-26 DIAGNOSIS — E03.9 ACQUIRED HYPOTHYROIDISM: ICD-10-CM

## 2019-11-26 PROCEDURE — 99309 SBSQ NF CARE MODERATE MDM 30: CPT | Performed by: PHYSICAL MEDICINE & REHABILITATION

## 2019-11-29 VITALS
SYSTOLIC BLOOD PRESSURE: 120 MMHG | WEIGHT: 107.5 LBS | DIASTOLIC BLOOD PRESSURE: 60 MMHG | OXYGEN SATURATION: 94 % | RESPIRATION RATE: 18 BRPM | BODY MASS INDEX: 18.45 KG/M2 | TEMPERATURE: 97.6 F | HEART RATE: 64 BPM

## 2019-11-29 RX ORDER — ACETAMINOPHEN 500 MG
1000 TABLET ORAL EVERY 6 HOURS PRN
COMMUNITY

## 2019-11-29 RX ORDER — FERROUS SULFATE 325(65) MG
325 TABLET ORAL 2 TIMES DAILY
COMMUNITY

## 2019-11-29 ASSESSMENT — ENCOUNTER SYMPTOMS
RHINORRHEA: 0
VOMITING: 0
CONSTIPATION: 0
DIARRHEA: 0
SORE THROAT: 0
EYE REDNESS: 0
WHEEZING: 0
NAUSEA: 0
SHORTNESS OF BREATH: 0
COUGH: 0

## 2019-12-20 ENCOUNTER — OUTSIDE SERVICES (OUTPATIENT)
Dept: FAMILY MEDICINE CLINIC | Age: 84
End: 2019-12-20
Payer: MEDICARE

## 2019-12-20 VITALS
OXYGEN SATURATION: 94 % | BODY MASS INDEX: 18.37 KG/M2 | WEIGHT: 107 LBS | DIASTOLIC BLOOD PRESSURE: 68 MMHG | RESPIRATION RATE: 18 BRPM | TEMPERATURE: 97.8 F | HEART RATE: 64 BPM | SYSTOLIC BLOOD PRESSURE: 140 MMHG

## 2019-12-20 DIAGNOSIS — E44.1 MILD PROTEIN-CALORIE MALNUTRITION (HCC): ICD-10-CM

## 2019-12-20 DIAGNOSIS — M47.896 OTHER OSTEOARTHRITIS OF SPINE, LUMBAR REGION: Primary | ICD-10-CM

## 2019-12-20 DIAGNOSIS — N18.30 CKD (CHRONIC KIDNEY DISEASE), STAGE III (HCC): ICD-10-CM

## 2019-12-20 DIAGNOSIS — E03.9 ACQUIRED HYPOTHYROIDISM: ICD-10-CM

## 2019-12-20 DIAGNOSIS — I48.0 PAROXYSMAL ATRIAL FIBRILLATION (HCC): ICD-10-CM

## 2019-12-20 DIAGNOSIS — E53.8 VITAMIN B 12 DEFICIENCY: ICD-10-CM

## 2019-12-20 PROCEDURE — 99309 SBSQ NF CARE MODERATE MDM 30: CPT | Performed by: NURSE PRACTITIONER

## 2019-12-20 ASSESSMENT — ENCOUNTER SYMPTOMS
COUGH: 0
EYE REDNESS: 0
RHINORRHEA: 0
BACK PAIN: 1
SORE THROAT: 0
WHEEZING: 0
NAUSEA: 0
CONSTIPATION: 0
SHORTNESS OF BREATH: 0
DIARRHEA: 0
ABDOMINAL DISTENTION: 0
VOMITING: 0

## 2020-01-29 ENCOUNTER — OUTSIDE SERVICES (OUTPATIENT)
Dept: FAMILY MEDICINE CLINIC | Age: 85
End: 2020-01-29
Payer: MEDICARE

## 2020-01-29 PROCEDURE — 99309 SBSQ NF CARE MODERATE MDM 30: CPT | Performed by: PHYSICAL MEDICINE & REHABILITATION

## 2020-01-30 VITALS
WEIGHT: 104.5 LBS | OXYGEN SATURATION: 97 % | RESPIRATION RATE: 16 BRPM | DIASTOLIC BLOOD PRESSURE: 54 MMHG | SYSTOLIC BLOOD PRESSURE: 106 MMHG | HEART RATE: 64 BPM | BODY MASS INDEX: 17.94 KG/M2 | TEMPERATURE: 97.8 F

## 2020-01-30 PROBLEM — L98.421 SACRAL ULCER, LIMITED TO BREAKDOWN OF SKIN (HCC): Status: ACTIVE | Noted: 2020-01-30

## 2020-01-30 ASSESSMENT — ENCOUNTER SYMPTOMS
COUGH: 0
FACIAL SWELLING: 0
RHINORRHEA: 0
EYE PAIN: 0
CONSTIPATION: 0
PHOTOPHOBIA: 0
VOMITING: 0
EYE ITCHING: 0
SORE THROAT: 0
ABDOMINAL PAIN: 0
EYE REDNESS: 0
EYE DISCHARGE: 0
SINUS PAIN: 0
DIARRHEA: 0
NAUSEA: 0
SINUS PRESSURE: 0
WHEEZING: 0
CHOKING: 0
ABDOMINAL DISTENTION: 0
SHORTNESS OF BREATH: 0
STRIDOR: 0

## 2020-01-30 NOTE — PROGRESS NOTES
for activity change, chills, diaphoresis, fatigue and fever. HENT: Negative for congestion, ear discharge, ear pain, facial swelling, rhinorrhea, sinus pressure, sinus pain and sore throat. Eyes: Negative for photophobia, pain, discharge, redness, itching and visual disturbance. Respiratory: Negative for cough, choking, shortness of breath, wheezing and stridor. Cardiovascular: Negative for chest pain and leg swelling. Gastrointestinal: Negative for abdominal distention, abdominal pain, constipation, diarrhea, nausea and vomiting. Endocrine: Negative for polydipsia, polyphagia and polyuria. Genitourinary: Negative for dyspareunia, dysuria, frequency, hematuria, menstrual problem, vaginal bleeding, vaginal discharge and vaginal pain. Musculoskeletal: Negative for arthralgias, gait problem and myalgias. Skin: Negative for rash and wound. Allergic/Immunologic: Negative for environmental allergies and immunocompromised state. Neurological: Positive for weakness. Negative for dizziness, seizures, facial asymmetry, speech difficulty, light-headedness and headaches. Hematological: Does not bruise/bleed easily. Psychiatric/Behavioral: Negative for agitation, behavioral problems, dysphoric mood, hallucinations, self-injury, sleep disturbance and suicidal ideas. The patient is not nervous/anxious. Objective:     Physical Exam  Vitals signs and nursing note reviewed. Constitutional:       General: She is not in acute distress. Appearance: She is well-developed. She is not ill-appearing, toxic-appearing or diaphoretic. HENT:      Head: Normocephalic and atraumatic. Right Ear: External ear normal.      Left Ear: External ear normal.      Nose: Nose normal. No congestion or rhinorrhea. Mouth/Throat:      Pharynx: Oropharynx is clear. No oropharyngeal exudate or posterior oropharyngeal erythema. Eyes:      General: No scleral icterus. Right eye: No discharge. Left eye: No discharge. Extraocular Movements: Extraocular movements intact. Conjunctiva/sclera: Conjunctivae normal.      Pupils: Pupils are equal, round, and reactive to light. Neck:      Musculoskeletal: Normal range of motion and neck supple. No neck rigidity. Thyroid: No thyromegaly. Vascular: No JVD. Cardiovascular:      Rate and Rhythm: Normal rate and regular rhythm. Heart sounds: Normal heart sounds. No friction rub. No gallop. Pulmonary:      Effort: Pulmonary effort is normal. No respiratory distress. Breath sounds: Normal breath sounds. No stridor. No wheezing or rales. Abdominal:      General: Bowel sounds are normal. There is no distension. Palpations: Abdomen is soft. Tenderness: There is no abdominal tenderness. There is no guarding or rebound. Musculoskeletal: Normal range of motion. General: No deformity or signs of injury. Right shoulder: She exhibits no tenderness, no bony tenderness and no pain. Left shoulder: She exhibits no tenderness, no bony tenderness and no pain. Cervical back: She exhibits no tenderness, no bony tenderness and no pain. Thoracic back: She exhibits no tenderness, no bony tenderness, no pain and no spasm. Lumbar back: She exhibits no tenderness, no bony tenderness and no pain. Lymphadenopathy:      Cervical: No cervical adenopathy. Upper Body:      Right upper body: No supraclavicular adenopathy. Left upper body: No supraclavicular adenopathy. Skin:     General: Skin is warm and dry. Coloration: Skin is not jaundiced. Findings: No erythema or rash. Neurological:      General: No focal deficit present. Mental Status: She is alert and oriented to person, place, and time. Mental status is at baseline. Cranial Nerves: No cranial nerve deficit. Sensory: No sensory deficit. Motor: Weakness present. No atrophy, abnormal muscle tone or seizure activity. Coordination: Coordination normal.      Deep Tendon Reflexes: Reflexes normal.   Psychiatric:         Mood and Affect: Mood normal.         Speech: Speech normal.         Behavior: Behavior normal.         Thought Content: Thought content normal.         Judgment: Judgment normal.         VITALS  BP (!) 106/54   Pulse 64   Temp 97.8 °F (36.6 °C)   Resp 16   Wt 104 lb 8 oz (47.4 kg)   SpO2 97%   BMI 17.94 kg/m²     Assessment/Plan:     1. Spinal OA--chronic and stable; continue Tylenol, MVI, Iron supplement, Diclofenac gel prn; Zanaflex q 6 hours prn, Gabapentin. Followed by palliative care. 2.  Hypothyroidism--chronic and stable; continue Synthroid  3. Vitamin B 12 deficiency--chronic and stable; continue Vitamin B Complex tablets; monitor  4. Paroxysmal A-fib--chronic and stable; continue to support and monitor. No anticoag due to age and risk for fall. 5.  Mild protein-calorie malnutrition--Continue Remeron for appetite stimulation; monitor weight is stable. Labs have improved. 6.  CKD Stage III--chronic and stable; continue to monitor labs and weights. Labs stable. Spent 25 minutes with patient and nurse, discussing case, symptoms, medications, and management options and completing documentation. Patient seen and examined. History partially obtained by chart review and nursing notes. I have reviewed patient's past medical, surgical, social, and family history and have made updates where appropriate.   See facility EMR for updated medication list.     Electronically signed by Yary White MD on 1/29/2020 at 11:24 PM

## 2020-02-27 ENCOUNTER — OUTSIDE SERVICES (OUTPATIENT)
Dept: FAMILY MEDICINE CLINIC | Age: 85
End: 2020-02-27
Payer: MEDICARE

## 2020-02-27 VITALS
DIASTOLIC BLOOD PRESSURE: 57 MMHG | RESPIRATION RATE: 16 BRPM | SYSTOLIC BLOOD PRESSURE: 143 MMHG | HEART RATE: 60 BPM | BODY MASS INDEX: 18.37 KG/M2 | WEIGHT: 107 LBS | OXYGEN SATURATION: 94 % | TEMPERATURE: 98.4 F

## 2020-02-27 PROCEDURE — 99309 SBSQ NF CARE MODERATE MDM 30: CPT | Performed by: NURSE PRACTITIONER

## 2020-02-27 ASSESSMENT — ENCOUNTER SYMPTOMS
SHORTNESS OF BREATH: 0
SORE THROAT: 0
COUGH: 0
NAUSEA: 0
BACK PAIN: 1
VOMITING: 0
DIARRHEA: 0
CONSTIPATION: 0
WHEEZING: 0
RHINORRHEA: 0
EYE REDNESS: 0

## 2020-02-27 NOTE — PROGRESS NOTES
Shlomo Jack is a 80 y.o. female who presents today for her medical conditions/complaints as noted below. Chief Complaint   Patient presents with    1 Month Follow-Up           HPI:     Cisco is seen today for follow up on her chronic health conditions. She states she is doing \"exceptionally well for her age\". Her only complaint is her chronic back pain which she has had all her life. She states \" there is no need to complain about it\".        Past Medical History:   Diagnosis Date    GERD (gastroesophageal reflux disease)     History of blood transfusion     Osteoarthritis     Stenosis of cervical spine     Thyroid disease     Ulcer       Past Surgical History:   Procedure Laterality Date    BACK SURGERY      COLONOSCOPY      HYSTERECTOMY      SHOULDER SURGERY      UPPER GASTROINTESTINAL ENDOSCOPY         Family History   Problem Relation Age of Onset    Heart Disease Father     Early Death Father     Stroke Maternal Grandmother     Dementia Maternal Grandfather     Diabetes Paternal Grandmother     Diabetes Paternal Grandfather     Diabetes Maternal Aunt     Heart Disease Maternal Aunt     Heart Disease Maternal Uncle     Diabetes Paternal Aunt     Diabetes Paternal Uncle        Social History     Tobacco Use    Smoking status: Former Smoker     Last attempt to quit: 1976     Years since quittin.1    Smokeless tobacco: Never Used   Substance Use Topics    Alcohol use: No      Allergies   Allergen Reactions    Nsaids Other (See Comments)     Bleeding gastric ulcer    Codeine Other (See Comments)     Pass out    Dye [Iodides] Hives    Lidocaine-Epinephrine      Fast and irregular heart rate    Morphine Other (See Comments)     Makes her \"out of her head\"    Procaine Hcl      Pass out    Ultram [Tramadol] Anxiety       Health Maintenance   Topic Date Due    Pneumococcal 65+ years Vaccine (1 of 1 - PPSV23) 10/10/1980    Annual Wellness Visit (AWV)  2019    Flu vaccine (1) 09/01/2019    DTaP/Tdap/Td vaccine (1 - Tdap) 09/06/2025 (Originally 10/10/1926)    Shingles Vaccine (1 of 2) 09/06/2025 (Originally 10/10/1965)    TSH testing  06/20/2020    Potassium monitoring  11/08/2020    Creatinine monitoring  11/08/2020    Hepatitis A vaccine  Aged Out    Hepatitis B vaccine  Aged Out    Hib vaccine  Aged Out    Meningococcal (ACWY) vaccine  Aged Out       Subjective:      Review of Systems   Constitutional: Negative for activity change, chills, fatigue, fever and unexpected weight change. HENT: Positive for hearing loss. Negative for congestion, rhinorrhea and sore throat. Eyes: Negative for redness and visual disturbance. Respiratory: Negative for cough, shortness of breath and wheezing. Cardiovascular: Negative for chest pain and leg swelling. Gastrointestinal: Negative for constipation, diarrhea, nausea and vomiting. Endocrine: Negative for polydipsia and polyuria. Genitourinary: Negative for dysuria, frequency and hematuria. Musculoskeletal: Positive for back pain. Negative for arthralgias, gait problem and myalgias. Skin: Negative for rash and wound. Allergic/Immunologic: Negative for environmental allergies and immunocompromised state. Neurological: Negative for dizziness, light-headedness and headaches. Hematological: Does not bruise/bleed easily. Psychiatric/Behavioral: Negative for dysphoric mood and sleep disturbance. The patient is not nervous/anxious. Objective:     Physical Exam  BP (!) 143/57   Pulse 60   Temp 98.4 °F (36.9 °C)   Resp 16   Wt 107 lb (48.5 kg)   SpO2 94%   BMI 18.37 kg/m²     Assessment/Plan      1. Other osteoarthritis of spine, lumbar region   Chronic and on routine Tylenol and Gabapentin. Followed by palliative care. No changes today. 2. Acquired hypothyroidism   Continue lab monitoring. Continue Synthroid. 3. Vitamin B 12 deficiency   Continue Vitamin B12 and monitor labs.     4. Paroxysmal atrial fibrillation (HCC)   Stable; no OAC due to age and falls. Heart rate controlled. 5. Mild protein-calorie malnutrition ([facility] [address]   Weight stable at 107. Continue to monitor intakes. 6. CKD (chronic kidney disease), stage III (HCC)   Stable overall. 7. Intractable back pain - stable with current meds. Followed by palliative care. Patient seen and examined. History partially obtained by chart review and nursing notes. I have reviewed patient's past medical, surgical, social, and family history and have made updates where appropriate.   See facility EMR for updated medication list.       Electronically signed by MARIA DEL CARMEN Andrade CNP on 2/27/2020 at 4:03 PM

## 2020-03-25 ENCOUNTER — OUTSIDE SERVICES (OUTPATIENT)
Dept: FAMILY MEDICINE CLINIC | Age: 85
End: 2020-03-25
Payer: MEDICARE

## 2020-03-25 VITALS
BODY MASS INDEX: 18.19 KG/M2 | SYSTOLIC BLOOD PRESSURE: 124 MMHG | WEIGHT: 106 LBS | RESPIRATION RATE: 16 BRPM | TEMPERATURE: 97.4 F | OXYGEN SATURATION: 98 % | DIASTOLIC BLOOD PRESSURE: 70 MMHG | HEART RATE: 84 BPM

## 2020-03-25 PROCEDURE — 99309 SBSQ NF CARE MODERATE MDM 30: CPT | Performed by: FAMILY MEDICINE

## 2020-03-25 ASSESSMENT — ENCOUNTER SYMPTOMS
SORE THROAT: 0
CONSTIPATION: 0
WHEEZING: 0
RHINORRHEA: 0
COUGH: 0
EYE REDNESS: 0
BACK PAIN: 1
SHORTNESS OF BREATH: 0
DIARRHEA: 0
NAUSEA: 0
VOMITING: 0

## 2020-03-25 NOTE — PROGRESS NOTES
Pneumococcal 65+ years Vaccine (1 of 1 - PPSV23) 10/10/1980    Annual Wellness Visit (AWV)  05/29/2019    Flu vaccine (1) 09/01/2019    DTaP/Tdap/Td vaccine (1 - Tdap) 09/06/2025 (Originally 1934)    Shingles Vaccine (1 of 2) 09/06/2025 (Originally 10/10/1965)    TSH testing  06/20/2020    Potassium monitoring  11/08/2020    Creatinine monitoring  11/08/2020    Hepatitis A vaccine  Aged Out    Hepatitis B vaccine  Aged Out    Hib vaccine  Aged Out    Meningococcal (ACWY) vaccine  Aged Out       Subjective:      Review of Systems   Constitutional: Negative for activity change, appetite change, chills, fatigue and fever. HENT: Negative for congestion, rhinorrhea and sore throat. Eyes: Negative for redness and visual disturbance. Respiratory: Negative for cough, shortness of breath and wheezing. Cardiovascular: Negative for chest pain and leg swelling. Gastrointestinal: Negative for constipation, diarrhea, nausea and vomiting. Endocrine: Negative for polydipsia and polyuria. Genitourinary: Negative for dysuria, frequency and hematuria. Musculoskeletal: Positive for back pain (chronic, stable). Negative for arthralgias, gait problem and myalgias. Skin: Negative for rash and wound. Allergic/Immunologic: Negative for environmental allergies and immunocompromised state. Neurological: Negative for dizziness, light-headedness and headaches. Hematological: Does not bruise/bleed easily. Psychiatric/Behavioral: Negative for dysphoric mood and sleep disturbance. The patient is not nervous/anxious. Objective:     Physical Exam  Vitals signs and nursing note reviewed. Constitutional:       General: She is not in acute distress. Appearance: She is well-developed. HENT:      Head: Normocephalic and atraumatic. Right Ear: External ear normal.      Left Ear: External ear normal.      Nose: Nose normal.   Eyes:      General: No scleral icterus.         Right eye: No RRR today. Stable; no OAC due to age and falls. Heart rate controlled. Mild protein-calorie malnutrition ([address]   Weight stable at 106. Continue to monitor intakes. CKD (chronic kidney disease), stage III (HCC)   Stable overall. Intractable back pain - stable with current meds. Followed by palliative care. Patient seen and examined. History partially obtained by chart review and nursing notes. I have reviewed patient's past medical, surgical, social, and family history and have made updates where appropriate.   See facility EMR for updated medication list.       Electronically signed by Jean Chilel MD on 3/25/2020 at 11:32 AM

## 2020-04-22 ENCOUNTER — OUTSIDE SERVICES (OUTPATIENT)
Dept: FAMILY MEDICINE CLINIC | Age: 85
End: 2020-04-22
Payer: MEDICARE

## 2020-04-22 VITALS
BODY MASS INDEX: 17.85 KG/M2 | SYSTOLIC BLOOD PRESSURE: 110 MMHG | DIASTOLIC BLOOD PRESSURE: 70 MMHG | WEIGHT: 104 LBS | RESPIRATION RATE: 16 BRPM | OXYGEN SATURATION: 93 % | TEMPERATURE: 98.7 F | HEART RATE: 66 BPM

## 2020-04-22 PROCEDURE — 99490 CHRNC CARE MGMT STAFF 1ST 20: CPT | Performed by: NURSE PRACTITIONER

## 2020-04-22 PROCEDURE — 99309 SBSQ NF CARE MODERATE MDM 30: CPT | Performed by: NURSE PRACTITIONER

## 2020-04-22 NOTE — PROGRESS NOTES
pale.   Neurological:      Mental Status: She is alert. Mental status is at baseline. She is disoriented. /70   Pulse 66   Temp 98.7 °F (37.1 °C)   Resp 16   Wt 104 lb (47.2 kg)   SpO2 93%   BMI 17.85 kg/m²     Assessment/Plan      1. Paroxysmal atrial fibrillation (HCC)    2. Venous insufficiency of both lower extremities    3. Gastroesophageal reflux disease, esophagitis presence not specified    4. Acquired hypothyroidism    5. Other osteoarthritis of spine, lumbar region    6. Sacral ulcer, limited to breakdown of skin ([address]    7. CKD (chronic kidney disease), stage III ([address]    8. Spinal stenosis of lumbar region, unspecified whether neurogenic claudication present    9. Neurogenic urinary incontinence    10. Incontinence of feces, unspecified fecal incontinence type    11. Hyperlipidemia, unspecified hyperlipidemia type    12. Intractable back pain    13. Mild protein-calorie malnutrition ([address]      Chronic illnesses and diseases reviewed. Medications reviewed. No new orders at this time. Continue current medications. Please call if needed. Patient seen and examined. History partially obtained by chart review and nursing notes. I have reviewed patient's past medical, surgical, social, and family history and have made updates where appropriate.   See facility EMR for updated medication list.       Electronically signed by MARIA DEL CARMEN Ma CNP on 4/22/2020 at 3:18 PM

## 2020-05-27 ENCOUNTER — OUTSIDE SERVICES (OUTPATIENT)
Dept: FAMILY MEDICINE CLINIC | Age: 85
End: 2020-05-27
Payer: MEDICARE

## 2020-05-27 VITALS
RESPIRATION RATE: 16 BRPM | WEIGHT: 105.5 LBS | SYSTOLIC BLOOD PRESSURE: 104 MMHG | HEART RATE: 64 BPM | BODY MASS INDEX: 18.11 KG/M2 | DIASTOLIC BLOOD PRESSURE: 62 MMHG | OXYGEN SATURATION: 93 % | TEMPERATURE: 98.6 F

## 2020-05-27 PROCEDURE — 99490 CHRNC CARE MGMT STAFF 1ST 20: CPT | Performed by: NURSE PRACTITIONER

## 2020-05-27 PROCEDURE — 99309 SBSQ NF CARE MODERATE MDM 30: CPT | Performed by: NURSE PRACTITIONER

## 2020-05-27 ASSESSMENT — ENCOUNTER SYMPTOMS
WHEEZING: 0
SHORTNESS OF BREATH: 0
COUGH: 0
VOMITING: 0
RHINORRHEA: 0
NAUSEA: 0
CONSTIPATION: 0
DIARRHEA: 0
BACK PAIN: 1
EYE REDNESS: 0
SORE THROAT: 0

## 2020-05-27 NOTE — PROGRESS NOTES
Kevin Mahan is a 80 y.o. female who presents today for her medical conditions/complaints as noted below. Chief Complaint   Patient presents with    1 Month Follow-Up     Follow up on chronic health conditions           HPI:     Cisco is seen today for follow up on her chronic health conditions. She states she is okay today. She and nursing have no new concerns. No recent falls or labs. Noted pulse rate to be in 50-60's recently but she is in the 70's today. She does not complain of any concerns including shortness of breath, dizziness, or chest pain.        Past Medical History:   Diagnosis Date    GERD (gastroesophageal reflux disease)     History of blood transfusion     Osteoarthritis     Stenosis of cervical spine     Thyroid disease     Ulcer       Past Surgical History:   Procedure Laterality Date    BACK SURGERY      COLONOSCOPY      HYSTERECTOMY      SHOULDER SURGERY      UPPER GASTROINTESTINAL ENDOSCOPY         Family History   Problem Relation Age of Onset    Heart Disease Father     Early Death Father     Stroke Maternal Grandmother     Dementia Maternal Grandfather     Diabetes Paternal Grandmother     Diabetes Paternal Grandfather     Diabetes Maternal Aunt     Heart Disease Maternal Aunt     Heart Disease Maternal Uncle     Diabetes Paternal Aunt     Diabetes Paternal Uncle        Social History     Tobacco Use    Smoking status: Former Smoker     Last attempt to quit: 1976     Years since quittin.4    Smokeless tobacco: Never Used   Substance Use Topics    Alcohol use: No      Allergies   Allergen Reactions    Nsaids Other (See Comments)     Bleeding gastric ulcer    Codeine Other (See Comments)     Pass out    Dye [Iodides] Hives    Lidocaine-Epinephrine      Fast and irregular heart rate    Morphine Other (See Comments)     Makes her \"out of her head\"    Procaine Hcl      Pass out    Ultram [Tramadol] Anxiety       Health Maintenance   Topic Date 93%   BMI 18.11 kg/m²     Assessment/Plan      1. Other osteoarthritis of spine, lumbar region   Chronic and on routine Tylenol, Lidocaine, and Gabapentin. Followed by palliative care. No changes today. 2. Acquired hypothyroidism   Continue lab monitoring. Continue Synthroid. 3. Vitamin B 12 deficiency   Continue Vitamin B12 and monitor labs. 4. Paroxysmal atrial fibrillation (HCC)   Stable; no OAC due to age and falls. Heart rate controlled. Will add daily pulse monitoring x one week. 5. Mild protein-calorie malnutrition ([address]   Weight stable at 105. Continue to monitor intakes. 6. CKD (chronic kidney disease), stage III (HCC)   Stable overall. 7. Intractable back pain - stable with current meds. Followed by palliative care. Resident has CKD, Atrial fib, intractable pain and it is expected to last 12 or more months. These chronic conditions place resident at a significant risk of death, acute exacerbation, decline in function or functional decline. Her comprehensive plan was , revised and monitored today. I spent 20 minutes reviewing plan. Patient seen and examined. History partially obtained by chart review and nursing notes. I have reviewed patient's past medical, surgical, social, and family history and have made updates where appropriate.   See facility EMR for updated medication list.       Electronically signed by MARIA DEL CARMEN Esparza CNP on 5/27/2020 at 8:26 AM

## 2020-06-24 ENCOUNTER — OUTSIDE SERVICES (OUTPATIENT)
Dept: FAMILY MEDICINE CLINIC | Age: 85
End: 2020-06-24
Payer: MEDICARE

## 2020-06-24 VITALS
DIASTOLIC BLOOD PRESSURE: 66 MMHG | RESPIRATION RATE: 16 BRPM | OXYGEN SATURATION: 97 % | TEMPERATURE: 98.5 F | SYSTOLIC BLOOD PRESSURE: 134 MMHG | HEART RATE: 50 BPM | BODY MASS INDEX: 18.11 KG/M2 | WEIGHT: 105.5 LBS

## 2020-06-24 PROCEDURE — 99309 SBSQ NF CARE MODERATE MDM 30: CPT | Performed by: NURSE PRACTITIONER

## 2020-06-24 ASSESSMENT — ENCOUNTER SYMPTOMS
NAUSEA: 0
SHORTNESS OF BREATH: 0
COUGH: 0
RHINORRHEA: 0
VOMITING: 0
BACK PAIN: 1
SORE THROAT: 0
ABDOMINAL PAIN: 0

## 2020-06-24 NOTE — PROGRESS NOTES
esophagitis presence not specified   Stable - monitor    11. Neurogenic urinary incontinence    12. Sacral ulcer, limited to breakdown of skin ([address]    13. Incontinence of feces, unspecified fecal incontinence type    14. Other osteoarthritis of spine, lumbar region          Patient seen and examined. History partially obtained by chart review and nursing notes. I have reviewed patient's past medical, surgical, social, and family history and have made updates where appropriate.   See facility EMR for updated medication list.       Electronically signed by MARIA DEL CARMEN Conley CNP on 6/24/2020 at 1:38 PM

## 2020-07-31 ENCOUNTER — OUTSIDE SERVICES (OUTPATIENT)
Dept: FAMILY MEDICINE CLINIC | Age: 85
End: 2020-07-31
Payer: MEDICARE

## 2020-07-31 VITALS
RESPIRATION RATE: 16 BRPM | OXYGEN SATURATION: 95 % | TEMPERATURE: 97.1 F | HEART RATE: 68 BPM | DIASTOLIC BLOOD PRESSURE: 66 MMHG | SYSTOLIC BLOOD PRESSURE: 118 MMHG | WEIGHT: 103.5 LBS | BODY MASS INDEX: 17.77 KG/M2

## 2020-07-31 PROBLEM — D50.9 IRON DEFICIENCY ANEMIA: Status: ACTIVE | Noted: 2020-07-31

## 2020-07-31 PROCEDURE — 99490 CHRNC CARE MGMT STAFF 1ST 20: CPT | Performed by: NURSE PRACTITIONER

## 2020-07-31 PROCEDURE — 99309 SBSQ NF CARE MODERATE MDM 30: CPT | Performed by: NURSE PRACTITIONER

## 2020-07-31 ASSESSMENT — ENCOUNTER SYMPTOMS
DIARRHEA: 0
NAUSEA: 0
RHINORRHEA: 0
SHORTNESS OF BREATH: 0
WHEEZING: 0
COUGH: 0
EYE REDNESS: 0
VOMITING: 0
SORE THROAT: 0
CONSTIPATION: 0
BACK PAIN: 1

## 2020-07-31 NOTE — PROGRESS NOTES
Burton Nassar is a 80 y.o. female who presents today for her medical conditions/complaints as noted below. Chief Complaint   Patient presents with    1 Month Follow-Up     Follow up on chronic health conditions           HPI:     Cisco is seen today in her room. She states she has a sore area on her back. She has chronic back pain and this is typically controlled with current regimen of Gabapentin, Tylenol, Voltaren, Zanaflex and Lidocaine. Nursing reports they rarely use Zanaflex as this makes her sleepy. She has some tightness of the mid back. Encouraged use of Lidocaine to see if this improves. She has other chronic health conditions of anemia both iron and B12 deficiency which she is on supplements for. She has Hypothyroidism, atrial fib, and weight loss. She is currently on Remeron on weight loss and has a poor intakes and her weight is down 3 pounds in the last month. She is rate controlled and is no on [address] secondary to age, hx of anemia, and falls.        Past Medical History:   Diagnosis Date    GERD (gastroesophageal reflux disease)     History of blood transfusion     Osteoarthritis     Stenosis of cervical spine     Thyroid disease     Ulcer       Past Surgical History:   Procedure Laterality Date    BACK SURGERY      COLONOSCOPY      HYSTERECTOMY      SHOULDER SURGERY      UPPER GASTROINTESTINAL ENDOSCOPY         Family History   Problem Relation Age of Onset    Heart Disease Father     Early Death Father     Stroke Maternal Grandmother     Dementia Maternal Grandfather     Diabetes Paternal Grandmother     Diabetes Paternal Grandfather     Diabetes Maternal Aunt     Heart Disease Maternal Aunt     Heart Disease Maternal Uncle     Diabetes Paternal Aunt     Diabetes Paternal Uncle        Social History     Tobacco Use    Smoking status: Former Smoker     Last attempt to quit: 1976     Years since quittin.6    Smokeless tobacco: Never Used   Substance Use Topics  Alcohol use: No      Allergies   Allergen Reactions    Nsaids Other (See Comments)     Bleeding gastric ulcer    Codeine Other (See Comments)     Pass out    Dye [Iodides] Hives    Lidocaine-Epinephrine      Fast and irregular heart rate    Morphine Other (See Comments)     Makes her \"out of her head\"    Procaine Hcl      Pass out    Ultram [Tramadol] Anxiety       Health Maintenance   Topic Date Due    Pneumococcal 65+ years Vaccine (1 of 1 - PPSV23) 10/10/1980    Annual Wellness Visit (AWV)  05/29/2019    TSH testing  06/20/2020    DTaP/Tdap/Td vaccine (1 - Tdap) 09/06/2025 (Originally 1934)    Shingles Vaccine (1 of 2) 09/06/2025 (Originally 10/10/1965)    Flu vaccine (1) 09/01/2020    Potassium monitoring  11/08/2020    Creatinine monitoring  11/08/2020    Hepatitis A vaccine  Aged Out    Hepatitis B vaccine  Aged Out    Hib vaccine  Aged Out    Meningococcal (ACWY) vaccine  Aged Out       Subjective:      Review of Systems   Constitutional: Negative for chills, fatigue, fever and unexpected weight change (down 3 pounds). HENT: Negative for congestion, rhinorrhea and sore throat. Eyes: Positive for visual disturbance (wears glasses). Negative for redness. Respiratory: Negative for cough, shortness of breath and wheezing. Cardiovascular: Negative for chest pain and leg swelling. Gastrointestinal: Negative for constipation, diarrhea, nausea and vomiting. Endocrine: Negative for polydipsia and polyuria. Genitourinary: Negative for dysuria, frequency and hematuria. Musculoskeletal: Positive for arthralgias, back pain and gait problem. Negative for myalgias. Skin: Negative for rash and wound. Allergic/Immunologic: Negative for environmental allergies and immunocompromised state. Neurological: Positive for weakness. Negative for dizziness, light-headedness and headaches. Hematological: Bruises/bleeds easily. Psychiatric/Behavioral: Positive for confusion (mild). Negative for dysphoric mood and sleep disturbance. The patient is not nervous/anxious. Objective:     Physical Exam  Vitals signs and nursing note reviewed. Constitutional:       General: She is not in acute distress. Appearance: She is well-developed. HENT:      Head: Normocephalic and atraumatic. Right Ear: External ear normal. Decreased hearing noted. Left Ear: External ear normal. Decreased hearing noted. Nose: Nose normal.   Eyes:      General: No scleral icterus. Right eye: No discharge. Left eye: No discharge. Conjunctiva/sclera: Conjunctivae normal.   Neck:      Musculoskeletal: Neck supple. Thyroid: No thyromegaly. Vascular: No JVD. Cardiovascular:      Rate and Rhythm: Normal rate. Rhythm regularly irregular. Heart sounds: Normal heart sounds. Pulmonary:      Effort: Pulmonary effort is normal. No respiratory distress. Breath sounds: Normal breath sounds. No stridor. No wheezing or rales. Abdominal:      General: Bowel sounds are normal. There is no distension. Palpations: Abdomen is soft. Tenderness: There is no abdominal tenderness. Musculoskeletal: Normal range of motion. Right shoulder: She exhibits no tenderness, no bony tenderness and no pain. Left shoulder: She exhibits no tenderness, no bony tenderness and no pain. Cervical back: She exhibits no tenderness, no bony tenderness and no pain. Thoracic back: She exhibits tenderness, deformity (chronic) and pain. She exhibits no bony tenderness, no swelling, no edema and no spasm. Lumbar back: She exhibits no tenderness, no bony tenderness and no pain. Right lower leg: No edema. Left lower leg: No edema. Lymphadenopathy:      Cervical: No cervical adenopathy. Upper Body:      Right upper body: No supraclavicular adenopathy. Left upper body: No supraclavicular adenopathy. Skin:     General: Skin is warm and dry. Findings: No erythema or rash. Neurological:      Mental Status: She is alert and oriented to person, place, and time. Motor: No atrophy, abnormal muscle tone or seizure activity. Psychiatric:         Mood and Affect: Affect is labile. Speech: Speech normal.         Behavior: Behavior normal.         Cognition and Memory: Memory is impaired (mild). /66   Pulse 68   Temp 97.1 °F (36.2 °C)   Resp 16   Wt 103 lb 8 oz (46.9 kg)   SpO2 95%   BMI 17.77 kg/m²     Assessment/Plan      1. Other osteoarthritis of spine, lumbar region   Continue Gabapentin, Tylenol, Zanaflex, Lidocaine, Voltaren gel. 2. Intractable back pain - Continue as above. Palliative care to follow. 3. Paroxysmal atrial fibrillation (HCC) - no current meds. No OAC secondary to age, anemia, and falls. 4. Acquired hypothyroidism - chronic. Continue lab monitoring and levothyroxine. 5. Vitamin B 12 deficiency -continue supplementation and lab monitoring. 6. Mild protein-calorie malnutrition ([address] - weight down 3 pounds, continue Remeron and supplements. 7. Iron deficiency anemia, unspecified iron deficiency anemia type -continue lab monitoring and Ferrous sulfate. Resident has Atrial fib, hypothyroidism, Anemia and it is expected to last 12 or more months. These chronic conditions place resident at a significant risk of death, acute exacerbation, or functional decline. The patient's comprehensive plan was monitored today. I spent 20 minutes reviewing plan. Patient seen and examined. History partially obtained by chart review and nursing notes. I have reviewed patient's past medical, surgical, social, and family history and have made updates where appropriate.   See facility EMR for updated medication list.       Electronically signed by MARIA DEL CARMEN Morales CNP on 7/31/2020 at 12:04 PM

## 2020-08-28 ENCOUNTER — OUTSIDE SERVICES (OUTPATIENT)
Dept: FAMILY MEDICINE CLINIC | Age: 85
End: 2020-08-28
Payer: MEDICARE

## 2020-08-28 VITALS
TEMPERATURE: 98.7 F | OXYGEN SATURATION: 96 % | HEART RATE: 67 BPM | SYSTOLIC BLOOD PRESSURE: 135 MMHG | DIASTOLIC BLOOD PRESSURE: 66 MMHG | WEIGHT: 104 LBS | RESPIRATION RATE: 16 BRPM | BODY MASS INDEX: 17.85 KG/M2

## 2020-08-28 PROCEDURE — 99490 CHRNC CARE MGMT STAFF 1ST 20: CPT | Performed by: NURSE PRACTITIONER

## 2020-08-28 PROCEDURE — 99309 SBSQ NF CARE MODERATE MDM 30: CPT | Performed by: NURSE PRACTITIONER

## 2020-08-28 ASSESSMENT — ENCOUNTER SYMPTOMS
EYE REDNESS: 0
DIARRHEA: 0
VOMITING: 0
SHORTNESS OF BREATH: 0
WHEEZING: 0
NAUSEA: 0
CONSTIPATION: 0
RHINORRHEA: 0
COUGH: 0
SORE THROAT: 0
BACK PAIN: 1

## 2020-08-28 NOTE — PROGRESS NOTES
She is not in acute distress. Appearance: She is well-developed. HENT:      Head: Normocephalic and atraumatic. Right Ear: External ear normal.      Left Ear: External ear normal.      Nose: Nose normal.   Eyes:      General: No scleral icterus. Right eye: No discharge. Left eye: No discharge. Conjunctiva/sclera: Conjunctivae normal.   Neck:      Musculoskeletal: Neck supple. Thyroid: No thyromegaly. Vascular: No JVD. Cardiovascular:      Rate and Rhythm: Normal rate and regular rhythm. Heart sounds: Normal heart sounds. Pulmonary:      Effort: Pulmonary effort is normal. No respiratory distress. Breath sounds: Normal breath sounds. No stridor. No wheezing or rales. Abdominal:      General: Bowel sounds are normal. There is no distension. Palpations: Abdomen is soft. Tenderness: There is no abdominal tenderness. Musculoskeletal:         General: No deformity. Right shoulder: She exhibits no tenderness, no bony tenderness and no pain. Left shoulder: She exhibits no tenderness, no bony tenderness and no pain. Cervical back: She exhibits no tenderness, no bony tenderness and no pain. Thoracic back: She exhibits decreased range of motion, tenderness and pain. She exhibits no bony tenderness and no spasm. Lumbar back: She exhibits tenderness and pain. She exhibits no bony tenderness. Lymphadenopathy:      Cervical: No cervical adenopathy. Upper Body:      Right upper body: No supraclavicular adenopathy. Left upper body: No supraclavicular adenopathy. Skin:     General: Skin is warm and dry. Coloration: Skin is pale. Findings: No erythema or rash. Neurological:      Mental Status: She is alert and oriented to person, place, and time. Motor: No atrophy, abnormal muscle tone or seizure activity.    Psychiatric:         Speech: Speech normal.         Behavior: Behavior normal.         Cognition and Memory: Memory is impaired. /66   Pulse 67   Temp 98.7 °F (37.1 °C)   Resp 16   Wt 104 lb (47.2 kg)   SpO2 96%   BMI 17.85 kg/m²     Assessment/Plan      1. Other osteoarthritis of spine, lumbar region  Continue Gabapentin, Tylenol, Zanaflex, Lidocaine, Voltaren gel. Will make Lidocaine routine and increase Gabapentin 200mg at hs   2. Intractable back pain - Continue as above. Palliative care to follow. 3. Paroxysmal atrial fibrillation (HCC) - no current meds. No OAC secondary to age, anemia, and falls. 4. Acquired hypothyroidism - chronic. Continue lab monitoring and levothyroxine. 5. Vitamin B 12 deficiency -continue supplementation and lab monitoring. 6. Mild protein-calorie malnutrition ([address] - weight stable in the last month, continue Remeron and supplements. 7. Iron deficiency anemia, unspecified iron deficiency anemia type -continue lab monitoring and Ferrous sulfate. Resident has Atrial fib, hypothyroidism, Anemia and it is expected to last 12 or more months. These chronic conditions place resident at a significant risk of death, acute exacerbation, or functional decline. The patient's comprehensive plan was revised and  monitored today. I spent 20 minutes reviewing plan. Patient seen and examined. History partially obtained by chart review and nursing notes. I have reviewed patient's past medical, surgical, social, and family history and have made updates where appropriate.   See facility EMR for updated medication list.       Electronically signed by MARIA DEL CARMEN Egan CNP on 8/28/2020 at 12:12 PM

## 2020-09-24 ENCOUNTER — OUTSIDE SERVICES (OUTPATIENT)
Dept: FAMILY MEDICINE CLINIC | Age: 85
End: 2020-09-24
Payer: MEDICARE

## 2020-09-24 VITALS
HEART RATE: 56 BPM | WEIGHT: 105 LBS | DIASTOLIC BLOOD PRESSURE: 60 MMHG | BODY MASS INDEX: 18.02 KG/M2 | SYSTOLIC BLOOD PRESSURE: 120 MMHG | RESPIRATION RATE: 16 BRPM | TEMPERATURE: 97.7 F | OXYGEN SATURATION: 97 %

## 2020-09-24 PROCEDURE — 99309 SBSQ NF CARE MODERATE MDM 30: CPT | Performed by: NURSE PRACTITIONER

## 2020-09-24 NOTE — PROGRESS NOTES
Negrita Joel is a 80 y.o. female who presents today for her medical conditions/complaints as noted below. Chief Complaint   Patient presents with    1 Month Follow-Up     Follow up on chronic health conditions           HPI:     Cisco in today in her room. She is seen for follow-up on her chronic health conditions. She has anemia both iron and B12 deficiency, hypothyroidism, atrial fib, weight loss, fall history, intractable back pain, spinal stenosis. She is pleasant today and denies any complaints. She does states she needs some assistance getting ready this morning. Her weight is overall stable at 105 pounds which is up 1 pound since last month. She did receive 1 dose of PRN Haldol in the last month due to increased confusion and wandering. No other needed doses. This has been discontinued. She also had Zoloft added in the last month and this is been overall beneficial to her regimen. She has had no falls and has done well with recent increase in her Neurontin. She did have a UA done in the last month and this did not grow any bacteria.       Past Medical History:   Diagnosis Date    GERD (gastroesophageal reflux disease)     History of blood transfusion     Osteoarthritis     Stenosis of cervical spine     Thyroid disease     Ulcer       Past Surgical History:   Procedure Laterality Date    BACK SURGERY      COLONOSCOPY      HYSTERECTOMY      SHOULDER SURGERY      UPPER GASTROINTESTINAL ENDOSCOPY         Family History   Problem Relation Age of Onset    Heart Disease Father     Early Death Father     Stroke Maternal Grandmother     Dementia Maternal Grandfather     Diabetes Paternal Grandmother     Diabetes Paternal Grandfather     Diabetes Maternal Aunt     Heart Disease Maternal Aunt     Heart Disease Maternal Uncle     Diabetes Paternal Aunt     Diabetes Paternal Uncle        Social History     Tobacco Use    Smoking status: Former Smoker     Last attempt to quit: 1976     Years since quittin.7    Smokeless tobacco: Never Used   Substance Use Topics    Alcohol use: No      Allergies   Allergen Reactions    Nsaids Other (See Comments)     Bleeding gastric ulcer    Codeine Other (See Comments)     Pass out    Dye [Iodides] Hives    Lidocaine-Epinephrine      Fast and irregular heart rate    Morphine Other (See Comments)     Makes her \"out of her head\"    Procaine Hcl      Pass out    Ultram [Tramadol] Anxiety       Health Maintenance   Topic Date Due    Pneumococcal 65+ years Vaccine (1 of 1 - PPSV23) 10/10/1980    Annual Wellness Visit (AWV)  2019    TSH testing  2020    Flu vaccine (1) 2020    DTaP/Tdap/Td vaccine (1 - Tdap) 2025 (Originally 1934)    Shingles Vaccine (1 of 2) 2025 (Originally 10/10/1965)    Potassium monitoring  2020    Creatinine monitoring  2020    Hepatitis A vaccine  Aged Out    Hepatitis B vaccine  Aged Out    Hib vaccine  Aged Out    Meningococcal (ACWY) vaccine  Aged Out       Subjective:      Review of Systems    Objective:     Physical Exam  /60   Pulse 56   Temp 97.7 °F (36.5 °C)   Resp 16   Wt 105 lb (47.6 kg)   SpO2 97%   BMI 18.02 kg/m²     Assessment/Plan      1. Other osteoarthritis of spine, lumbar region -chronic and improved with most recent increase of gabapentin. We will continue and also continue Tylenol, Zanaflex, lidocaine, and Voltaren. Continue to monitor for falls. 2. Intractable back pain -continue above. Has chronic pain in her back. Palliative care continues to follow. 3. Paroxysmal atrial fibrillation (HCC) -rate controlled. No OAC secondary to age, anemia, and falls. 4. Acquired hypothyroidism -chronic and overall stable. Weight is stabilized. Continue lab monitoring levothyroxine. 5. Vitamin B 12 deficiency -continue supplementation and lab monitoring.    6. Mild protein-calorie malnutrition ([address] -chronic and continue weight monitoring. Continue Remeron at at bedtime. Sleeping overall well. 7. Iron deficiency anemia, unspecified iron deficiency anemia type -continue lab monitoring and ferrous sulfate. 8. Depression, unspecified depression type -recently had increased confusion with UA completed. Zoloft added in the last month. We will continue to monitor mood. Resident has Hypothyroidism, Atrial fib, OA, anemia and it is expected to last 12 or more months. These chronic conditions place resident at a significant risk of death, acute exacerbation, or functional decline. The patient's comprehensive plan was monitored today. I spent 20 minutes reviewing plan. Patient seen and examined. History partially obtained by chart review and nursing notes. I have reviewed patient's past medical, surgical, social, and family history and have made updates where appropriate.   See facility EMR for updated medication list.       Electronically signed by MARIA DEL CARMEN Mccauley CNP on 9/24/2020 at 10:19 AM

## 2020-10-21 ENCOUNTER — OUTSIDE SERVICES (OUTPATIENT)
Dept: FAMILY MEDICINE CLINIC | Age: 85
End: 2020-10-21
Payer: MEDICARE

## 2020-10-21 VITALS
RESPIRATION RATE: 16 BRPM | TEMPERATURE: 98.7 F | WEIGHT: 104 LBS | HEART RATE: 60 BPM | DIASTOLIC BLOOD PRESSURE: 58 MMHG | OXYGEN SATURATION: 97 % | SYSTOLIC BLOOD PRESSURE: 104 MMHG | BODY MASS INDEX: 17.85 KG/M2

## 2020-10-21 PROCEDURE — 99490 CHRNC CARE MGMT STAFF 1ST 20: CPT | Performed by: NURSE PRACTITIONER

## 2020-10-21 PROCEDURE — 99309 SBSQ NF CARE MODERATE MDM 30: CPT | Performed by: NURSE PRACTITIONER

## 2020-10-21 NOTE — PROGRESS NOTES
Verenice Calhoun is a 80 y.o. female that presents for 1 Month Follow-Up (Follow up on chronic health conditions)      This visit was performed via synchronous telecommunication system. Patient is located in the SNF  I am located in my office    HPI:  Niesha Garduno is in today for follow-up on her chronic health conditions. She is resting in bed and does not arouse for her visit today. She is assisted with her video visit with nursing staff today. She did celebrate her 89th birthday last week in she did enjoy this per nursing staff. She has had no recent falls or labs. She does have chronic conditions of atrial fib, osteoarthritis, intractable back pain, vitamin B deficiency, iron deficiency anemia, spinal stenosis, mild protein calorie malnutrition, chronic kidney disease. She is frail but overall is doing well for her age. Her weight is down 1 pound this month and we will continue her current regimen. She does frequently complain of pain but overall this is improved with recent increase of gabapentin. Her atrial fib is controlled and is not on [address] secondary to age, and anemia. I have reviewed the patient's past medical history, past surgical history, allergies,medications, social and family history and I have made updates where appropriate.     Past Medical History:   Diagnosis Date    GERD (gastroesophageal reflux disease)     History of blood transfusion     Osteoarthritis     Stenosis of cervical spine     Thyroid disease     Ulcer        Past Surgical History:   Procedure Laterality Date    BACK SURGERY      COLONOSCOPY      HYSTERECTOMY      SHOULDER SURGERY      UPPER GASTROINTESTINAL ENDOSCOPY         Social History     Tobacco Use    Smoking status: Former Smoker     Last attempt to quit: 1976     Years since quittin.8    Smokeless tobacco: Never Used   Substance Use Topics    Alcohol use: No    Drug use: No       Family History   Problem Relation Age of Onset    Heart 10/21/2020 at 1:07 PM

## 2020-10-27 ENCOUNTER — OUTSIDE SERVICES (OUTPATIENT)
Dept: FAMILY MEDICINE CLINIC | Age: 85
End: 2020-10-27
Payer: MEDICARE

## 2020-10-27 VITALS
HEART RATE: 64 BPM | DIASTOLIC BLOOD PRESSURE: 74 MMHG | SYSTOLIC BLOOD PRESSURE: 146 MMHG | OXYGEN SATURATION: 95 % | WEIGHT: 104 LBS | TEMPERATURE: 98.3 F | RESPIRATION RATE: 16 BRPM | BODY MASS INDEX: 17.85 KG/M2

## 2020-10-27 PROBLEM — F32.A DEPRESSION: Status: ACTIVE | Noted: 2020-10-27

## 2020-10-27 PROCEDURE — 99490 CHRNC CARE MGMT STAFF 1ST 20: CPT | Performed by: PHYSICAL MEDICINE & REHABILITATION

## 2020-10-27 PROCEDURE — 99309 SBSQ NF CARE MODERATE MDM 30: CPT | Performed by: PHYSICAL MEDICINE & REHABILITATION

## 2020-10-27 ASSESSMENT — ENCOUNTER SYMPTOMS
VOMITING: 0
EYE REDNESS: 0
TROUBLE SWALLOWING: 0
CONSTIPATION: 0
ABDOMINAL DISTENTION: 0
FACIAL SWELLING: 0
CHOKING: 0
BACK PAIN: 1
COUGH: 0
RHINORRHEA: 0
EYE DISCHARGE: 0
SHORTNESS OF BREATH: 0

## 2020-10-27 NOTE — PROGRESS NOTES
Raymond Jones is a 80 y.o. female that presents for Other (Monthly visit)      This visit was performed via synchronous telecommunication system. Patient is located in the SNF  I am located in my office    HPI:    I have reviewed the patient's past medical history, past surgical history, allergies,medications, social and family history and I have made updates where appropriate. The patient was seen per myself today for her monthly visit (via as a virtual visit) to assess her chronic medical conditions. A chaperone was present throughout the entire interview and exam.  When asked how she was feeling she stated not very great. She takes Tylenol routinely. She has a history of polyarthropathy throughout. She states she has no ambition (possibly related to her age!) and states she wants no additional medication at this time just wants to Buck it out \". She denied chest pain, shortness of breath, nausea, and vomiting. She has been sleeping well and her bowels been loose. Nursing has no other new concerns.     Past Medical History:   Diagnosis Date    GERD (gastroesophageal reflux disease)     History of blood transfusion     Osteoarthritis     Stenosis of cervical spine     Thyroid disease     Ulcer        Past Surgical History:   Procedure Laterality Date    BACK SURGERY      COLONOSCOPY      HYSTERECTOMY      SHOULDER SURGERY      UPPER GASTROINTESTINAL ENDOSCOPY         Social History     Tobacco Use    Smoking status: Former Smoker     Last attempt to quit: 1976     Years since quittin.8    Smokeless tobacco: Never Used   Substance Use Topics    Alcohol use: No    Drug use: No       Family History   Problem Relation Age of Onset    Heart Disease Father     Early Death Father     Stroke Maternal Grandmother     Dementia Maternal Grandfather     Diabetes Paternal Grandmother     Diabetes Paternal Grandfather     Diabetes Maternal Aunt     Heart Disease Maternal Aunt  Heart Disease Maternal Uncle     Diabetes Paternal Aunt     Diabetes Paternal Uncle          Review of Systems   Constitutional: Negative for activity change, diaphoresis and fever. HENT: Negative for congestion, drooling, ear discharge, facial swelling, rhinorrhea and trouble swallowing. Eyes: Negative for discharge, redness and visual disturbance. Respiratory: Negative for cough, choking and shortness of breath. Cardiovascular: Negative for chest pain and leg swelling. Gastrointestinal: Negative for abdominal distention, constipation and vomiting. Endocrine: Negative for polyphagia. Genitourinary: Negative for dysuria and frequency. Musculoskeletal: Positive for back pain, myalgias and neck pain. Negative for arthralgias and gait problem. Chronic polyosteoarthritis   Skin: Negative for rash and wound. Allergic/Immunologic: Negative for environmental allergies and immunocompromised state. Neurological: Positive for headaches. Negative for seizures and facial asymmetry. He states she gets an occasional headache   Hematological: Does not bruise/bleed easily. Psychiatric/Behavioral: Negative for sleep disturbance. The patient is not nervous/anxious. PHYSICAL EXAM:  Vitals:    10/27/20 1540   BP: (!) 146/74   Pulse: 64   Resp: 16   Temp: 98.3 °F (36.8 °C)   SpO2: 95%        Physical Exam     ASSESSMENT & PLAN  1. Other osteoarthritis of spine, lumbar region  Continue current regimen of gabapentin, Tylenol, Zanaflex, lidocaine, and Voltaren. No recent falls. Does rest frequently throughout the day. 2.  Intractable back pain  Continue to provide above regimen. 3.  Paroxysmal atrial fibrillation (HCC)  Rate controlled. Continue to monitor. No OAC related to age, anemia, and falls. 4.  Acquired hypothyroidism  Continue current dose of levothyroxine. Lab monitoring in place.   5.  Iron deficiency anemia, unspecified iron deficiency anemia type  Chronic and continue ferrous sulfate. Labs in place to monitor H&H.  6.  Depression, unspecified depression type  Mood overall stable. No recent labs. Continue Zoloft. No follow-ups on file. Resident has Atrial fib, Anemia, SS, OA and it is expected to last 12 or more months. These chronic conditions place resident at a significant risk of death, acute exacerbation, decline in function or functional decline. The patient's comprehensive plan was monitored today. I spent 20 minutes reviewing plan. I have seen and examined the patient virtually and chaperone was present. The history was obtained through the patient, past medical records, and the staff. The patient's chart was updated as appropriate. Please see facility EMR for complete medication reconciliation. Pursuant to the emergency declaration under the [facility] [place], [facility] waiver authority and the [employer] Act, this Virtual  Visit was conducted, with patient's consent, to reduce the patient's risk of exposure to COVID-19 and provide continuity of care for an established patient. Services were provided through a video synchronous discussion virtually to substitute for in-person SNF visit.     Electronically signed by Arleen Christopher MD on 10/27/2020 at 3:47 PM

## 2020-11-18 ENCOUNTER — OUTSIDE SERVICES (OUTPATIENT)
Dept: FAMILY MEDICINE CLINIC | Age: 85
End: 2020-11-18
Payer: MEDICARE

## 2020-11-18 PROCEDURE — 99309 SBSQ NF CARE MODERATE MDM 30: CPT | Performed by: PHYSICAL MEDICINE & REHABILITATION

## 2020-11-18 PROCEDURE — 99490 CHRNC CARE MGMT STAFF 1ST 20: CPT | Performed by: PHYSICAL MEDICINE & REHABILITATION

## 2020-11-20 VITALS
DIASTOLIC BLOOD PRESSURE: 60 MMHG | OXYGEN SATURATION: 92 % | SYSTOLIC BLOOD PRESSURE: 124 MMHG | TEMPERATURE: 97.7 F | WEIGHT: 104 LBS | RESPIRATION RATE: 18 BRPM | BODY MASS INDEX: 17.85 KG/M2 | HEART RATE: 66 BPM

## 2020-11-20 ASSESSMENT — ENCOUNTER SYMPTOMS
VOMITING: 0
CONSTIPATION: 0
WHEEZING: 0
COUGH: 0
NAUSEA: 0
RHINORRHEA: 0
DIARRHEA: 0
EYE DISCHARGE: 0
EYE REDNESS: 0
BACK PAIN: 1
SORE THROAT: 0
SHORTNESS OF BREATH: 0
CHOKING: 0
ABDOMINAL DISTENTION: 0

## 2020-11-20 NOTE — PROGRESS NOTES
Landen Dawn is a 80 y.o. female that presents for Other (Monthly visit)      This visit was performed via synchronous telecommunication system. Patient is located in the SNF  I am located in my office at home. HPI:    I have reviewed the patient's past medical history, past surgical history, allergies,medications, social and family history and I have made updates where appropriate. The patient is a 80-year-old female who was seen today per virtual rounds to assess her chronic medical conditions. She has been sleeping well and her bowels have been moving. She just came out of the Covid unit and is doing great! Has no new concerns today. Even though back pain is a usual complaint of hers she did not even mention it today and states she needs nothing special today. Her ankles are without any edema in either leg. She denied headache, chest pain, shortness of breath, nausea, and vomiting. She was upbeat and very optimistic today.     Past Medical History:   Diagnosis Date    GERD (gastroesophageal reflux disease)     History of blood transfusion     Osteoarthritis     Stenosis of cervical spine     Thyroid disease     Ulcer        Past Surgical History:   Procedure Laterality Date    BACK SURGERY      COLONOSCOPY      HYSTERECTOMY      SHOULDER SURGERY      UPPER GASTROINTESTINAL ENDOSCOPY         Social History     Tobacco Use    Smoking status: Former Smoker     Last attempt to quit: 1976     Years since quittin.9    Smokeless tobacco: Never Used   Substance Use Topics    Alcohol use: No    Drug use: No       Family History   Problem Relation Age of Onset    Heart Disease Father     Early Death Father     Stroke Maternal Grandmother     Dementia Maternal Grandfather     Diabetes Paternal Grandmother     Diabetes Paternal Grandfather     Diabetes Maternal Aunt     Heart Disease Maternal Aunt     Heart Disease Maternal Uncle     Diabetes Paternal Kip Argue Diabetes Paternal Uncle          Review of Systems   Constitutional: Positive for fatigue. Negative for chills and fever. HENT: Negative for congestion, ear discharge, rhinorrhea and sore throat. Eyes: Negative for discharge, redness and visual disturbance. Respiratory: Negative for cough, choking, shortness of breath and wheezing. Cardiovascular: Negative for chest pain and leg swelling. Gastrointestinal: Negative for abdominal distention, constipation, diarrhea, nausea and vomiting. Endocrine: Negative for polydipsia and polyuria. Genitourinary: Negative for dysuria, frequency, hematuria and urgency. Musculoskeletal: Positive for back pain and gait problem. Negative for arthralgias and myalgias. Skin: Negative for rash and wound. Allergic/Immunologic: Negative for environmental allergies and immunocompromised state. Neurological: Positive for weakness. Negative for dizziness, speech difficulty, light-headedness and headaches. Hematological: Does not bruise/bleed easily. Psychiatric/Behavioral: Negative for dysphoric mood and sleep disturbance. The patient is not nervous/anxious. PHYSICAL EXAM:  Vitals:    11/20/20 1751   BP: 124/60   Pulse: 66   Resp: 18   Temp: 97.7 °F (36.5 °C)   SpO2: 92%        Physical Exam  Vitals signs and nursing note reviewed. Exam conducted with a chaperone present. Constitutional:       General: She is not in acute distress. Appearance: Normal appearance. She is not ill-appearing, toxic-appearing or diaphoretic. HENT:      Head: Normocephalic and atraumatic. Right Ear: External ear normal.      Left Ear: External ear normal.      Nose: Nose normal. No congestion or rhinorrhea. Mouth/Throat:      Mouth: Mucous membranes are moist.      Pharynx: Oropharynx is clear. Eyes:      General:         Right eye: No discharge. Left eye: No discharge. Extraocular Movements: Extraocular movements intact.       Conjunctiva/sclera: Emergencies Act, 1135 waiver authority and the Coronavirus Preparedness and Response Supplemental Appropriations Act, this Virtual  Visit was conducted, with patient's consent, to reduce the patient's risk of exposure to COVID-19 and provide continuity of care for an established patient. Services were provided through a video synchronous discussion virtually to substitute for in-person SNF visit.     Electronically signed by Tyrese Posey MD on 11/18/2020 at 5:57 PM

## 2020-12-15 ENCOUNTER — OUTSIDE SERVICES (OUTPATIENT)
Dept: FAMILY MEDICINE CLINIC | Age: 85
End: 2020-12-15
Payer: MEDICARE

## 2020-12-15 VITALS
OXYGEN SATURATION: 92 % | BODY MASS INDEX: 15.11 KG/M2 | RESPIRATION RATE: 16 BRPM | WEIGHT: 88 LBS | TEMPERATURE: 97.7 F | HEART RATE: 62 BPM | SYSTOLIC BLOOD PRESSURE: 122 MMHG | DIASTOLIC BLOOD PRESSURE: 60 MMHG

## 2020-12-15 PROCEDURE — 99309 SBSQ NF CARE MODERATE MDM 30: CPT | Performed by: NURSE PRACTITIONER

## 2020-12-15 NOTE — PROGRESS NOTES
Other (See Comments)     Bleeding gastric ulcer    Codeine Other (See Comments)     Pass out    Dye [Iodides] Hives    Lidocaine-Epinephrine      Fast and irregular heart rate    Morphine Other (See Comments)     Makes her \"out of her head\"    Procaine Hcl      Pass out    Ultram [Tramadol] Anxiety       Health Maintenance   Topic Date Due    Pneumococcal 65+ years Vaccine (1 of 1 - PPSV23) 10/10/1980    Annual Wellness Visit (AWV)  05/29/2019    TSH testing  06/20/2020    Flu vaccine (1) 09/01/2020    Potassium monitoring  11/08/2020    Creatinine monitoring  11/08/2020    DTaP/Tdap/Td vaccine (1 - Tdap) 09/06/2025 (Originally 1934)    Shingles Vaccine (1 of 2) 09/06/2025 (Originally 10/10/1965)    Hepatitis A vaccine  Aged Out    Hepatitis B vaccine  Aged Out    Hib vaccine  Aged Out    Meningococcal (ACWY) vaccine  Aged Out       Subjective:      Review of Systems   Unable to perform ROS: Dementia       Objective:     Physical Exam  Vitals signs and nursing note reviewed. Constitutional:       General: She is not in acute distress. Appearance: Normal appearance. She is cachectic. She is ill-appearing (chronically). HENT:      Head: Normocephalic and atraumatic. Right Ear: Hearing normal.      Left Ear: Hearing normal.      Nose: Nose normal.   Eyes:      General: Lids are normal.   Neck:      Musculoskeletal: Normal range of motion and neck supple. Cardiovascular:      Rate and Rhythm: Normal rate. Rhythm regularly irregular. Heart sounds: Normal heart sounds, S1 normal and S2 normal.   Pulmonary:      Effort: Pulmonary effort is normal. No tachypnea, bradypnea or respiratory distress. Breath sounds: Decreased breath sounds present. Abdominal:      General: Abdomen is flat. Bowel sounds are normal.      Palpations: Abdomen is soft. Tenderness: There is no abdominal tenderness. Musculoskeletal:      Right lower leg: No edema. Left lower leg: No edema. Skin:     General: Skin is warm and dry. Coloration: Skin is pale. Neurological:      Mental Status: Mental status is at baseline. She is lethargic. Psychiatric:         Speech: She is noncommunicative. Cognition and Memory: Memory is impaired. She exhibits impaired recent memory and impaired remote memory. /60   Pulse 62   Temp 97.7 °F (36.5 °C)   Resp 16   Wt 88 lb (39.9 kg)   SpO2 92%   BMI 15.11 kg/m²     Assessment/Plan      1. Other osteoarthritis of spine, lumbar region - Chronic and continue current pain regimen. 2. Paroxysmal atrial fibrillation (HCC) - rate controlled. No s/s of palpitations. 3. Spinal stenosis of lumbar region, unspecified whether neurogenic claudication present - Chronic and no s/s of pain. Continue pain management. 4. Stage 3 chronic kidney disease, unspecified whether stage 3a or 3b CKD - Continue to provide supportive care. 5. Iron deficiency anemia, unspecified iron deficiency anemia type - pale with history of blood loss. Continue supportive care. 6. Hospice care          Patient seen and examined. History partially obtained by chart review and nursing notes. I have reviewed patient's past medical, surgical, social, and family history and have made updates where appropriate.   See facility EMR for updated medication list.       Electronically signed by MARIA DEL CARMEN Garcia CNP on 12/15/2020 at 7:01 PM